# Patient Record
Sex: FEMALE | Race: WHITE | NOT HISPANIC OR LATINO | Employment: FULL TIME | URBAN - METROPOLITAN AREA
[De-identification: names, ages, dates, MRNs, and addresses within clinical notes are randomized per-mention and may not be internally consistent; named-entity substitution may affect disease eponyms.]

---

## 2017-01-18 ENCOUNTER — OFFICE VISIT - RIVER FALLS (OUTPATIENT)
Dept: FAMILY MEDICINE | Facility: CLINIC | Age: 37
End: 2017-01-18

## 2017-01-18 ASSESSMENT — MIFFLIN-ST. JEOR: SCORE: 1310.91

## 2018-03-14 ENCOUNTER — OFFICE VISIT - RIVER FALLS (OUTPATIENT)
Dept: FAMILY MEDICINE | Facility: CLINIC | Age: 38
End: 2018-03-14

## 2018-03-14 ASSESSMENT — MIFFLIN-ST. JEOR: SCORE: 1324.06

## 2018-03-15 LAB
CREAT SERPL-MCNC: 1.07 MG/DL (ref 0.5–1.1)
GLUCOSE BLD-MCNC: 73 MG/DL (ref 65–99)

## 2018-04-23 ENCOUNTER — OFFICE VISIT - RIVER FALLS (OUTPATIENT)
Dept: FAMILY MEDICINE | Facility: CLINIC | Age: 38
End: 2018-04-23

## 2018-04-23 ASSESSMENT — MIFFLIN-ST. JEOR: SCORE: 1285.96

## 2018-04-24 ENCOUNTER — AMBULATORY - RIVER FALLS (OUTPATIENT)
Dept: FAMILY MEDICINE | Facility: CLINIC | Age: 38
End: 2018-04-24

## 2018-07-16 ENCOUNTER — OFFICE VISIT - RIVER FALLS (OUTPATIENT)
Dept: FAMILY MEDICINE | Facility: CLINIC | Age: 38
End: 2018-07-16

## 2018-07-16 ASSESSMENT — MIFFLIN-ST. JEOR: SCORE: 1304.1

## 2018-08-10 ENCOUNTER — OFFICE VISIT - RIVER FALLS (OUTPATIENT)
Dept: FAMILY MEDICINE | Facility: CLINIC | Age: 38
End: 2018-08-10

## 2018-08-10 ASSESSMENT — MIFFLIN-ST. JEOR: SCORE: 1303.2

## 2018-09-05 ENCOUNTER — OFFICE VISIT - RIVER FALLS (OUTPATIENT)
Dept: FAMILY MEDICINE | Facility: CLINIC | Age: 38
End: 2018-09-05

## 2018-09-05 ASSESSMENT — MIFFLIN-ST. JEOR: SCORE: 1310.45

## 2018-09-06 LAB
CREAT SERPL-MCNC: 1.11 MG/DL (ref 0.5–1.1)
GLUCOSE BLD-MCNC: 95 MG/DL (ref 65–99)

## 2019-02-26 ENCOUNTER — OFFICE VISIT - RIVER FALLS (OUTPATIENT)
Dept: FAMILY MEDICINE | Facility: CLINIC | Age: 39
End: 2019-02-26

## 2019-02-26 ASSESSMENT — MIFFLIN-ST. JEOR: SCORE: 1299.57

## 2019-02-27 LAB
BUN SERPL-MCNC: 10 MG/DL (ref 7–25)
BUN/CREAT RATIO - HISTORICAL: ABNORMAL (ref 6–22)
CALCIUM SERPL-MCNC: 7.6 MG/DL (ref 8.6–10.2)
CHLORIDE BLD-SCNC: 105 MMOL/L (ref 98–110)
CO2 SERPL-SCNC: 27 MMOL/L (ref 20–32)
CREAT SERPL-MCNC: 0.85 MG/DL (ref 0.5–1.1)
EGFRCR SERPLBLD CKD-EPI 2021: 87 ML/MIN/1.73M2
GLUCOSE BLD-MCNC: 89 MG/DL (ref 65–99)
POTASSIUM BLD-SCNC: 4.5 MMOL/L (ref 3.5–5.3)
SODIUM SERPL-SCNC: 140 MMOL/L (ref 135–146)
T3FREE SERPL-MCNC: 3.3 PG/ML (ref 2.3–4.2)
T4 FREE SERPL-MCNC: 1.8 NG/DL (ref 0.8–1.8)
TSH SERPL DL<=0.005 MIU/L-ACNC: 0.03 MIU/L

## 2020-01-23 ENCOUNTER — OFFICE VISIT - RIVER FALLS (OUTPATIENT)
Dept: FAMILY MEDICINE | Facility: CLINIC | Age: 40
End: 2020-01-23

## 2020-01-23 ASSESSMENT — MIFFLIN-ST. JEOR: SCORE: 1290.72

## 2020-01-24 ENCOUNTER — COMMUNICATION - RIVER FALLS (OUTPATIENT)
Dept: FAMILY MEDICINE | Facility: CLINIC | Age: 40
End: 2020-01-24

## 2020-01-24 LAB
A/G RATIO - HISTORICAL: 1.9 (ref 1–2.5)
ALBUMIN SERPL-MCNC: 4.5 GM/DL (ref 3.6–5.1)
ALP SERPL-CCNC: 51 UNIT/L (ref 33–115)
ALT SERPL W P-5'-P-CCNC: 11 UNIT/L (ref 6–29)
AST SERPL W P-5'-P-CCNC: 12 UNIT/L (ref 10–30)
BILIRUB SERPL-MCNC: 0.5 MG/DL (ref 0.2–1.2)
BUN SERPL-MCNC: 11 MG/DL (ref 7–25)
BUN/CREAT RATIO - HISTORICAL: ABNORMAL (ref 6–22)
CALCIUM SERPL-MCNC: 8.5 MG/DL (ref 8.6–10.2)
CALCIUM SERPL-MCNC: 8.6 MG/DL (ref 8.6–10.2)
CALCIUM,IONIZED - HISTORICAL: 4.3 MG/DL (ref 4.8–5.6)
CHLORIDE BLD-SCNC: 104 MMOL/L (ref 98–110)
CHOLEST SERPL-MCNC: 168 MG/DL
CHOLEST/HDLC SERPL: 2.5 {RATIO}
CO2 SERPL-SCNC: 26 MMOL/L (ref 20–32)
CREAT SERPL-MCNC: 1.03 MG/DL (ref 0.5–1.1)
EGFRCR SERPLBLD CKD-EPI 2021: 69 ML/MIN/1.73M2
ERYTHROCYTE [DISTWIDTH] IN BLOOD BY AUTOMATED COUNT: 13.5 % (ref 11–15)
GLOBULIN: 2.4 (ref 1.9–3.7)
GLUCOSE BLD-MCNC: 94 MG/DL (ref 65–99)
HCT VFR BLD AUTO: 41.2 % (ref 35–45)
HDLC SERPL-MCNC: 66 MG/DL
HGB BLD-MCNC: 13.3 GM/DL (ref 11.7–15.5)
LDLC SERPL CALC-MCNC: 85 MG/DL
MCH RBC QN AUTO: 27.3 PG (ref 27–33)
MCHC RBC AUTO-ENTMCNC: 32.3 GM/DL (ref 32–36)
MCV RBC AUTO: 84.4 FL (ref 80–100)
NONHDLC SERPL-MCNC: 102 MG/DL
PLATELET # BLD AUTO: 337 10*3/UL (ref 140–400)
PMV BLD: 11.3 FL (ref 7.5–12.5)
POTASSIUM BLD-SCNC: 4.1 MMOL/L (ref 3.5–5.3)
PROT SERPL-MCNC: 6.9 GM/DL (ref 6.1–8.1)
PTH-INTACT SERPL-MCNC: <1 PG/ML (ref 14–64)
RBC # BLD AUTO: 4.88 10*6/UL (ref 3.8–5.1)
SODIUM SERPL-SCNC: 141 MMOL/L (ref 135–146)
T3FREE SERPL-MCNC: 3.1 PG/ML (ref 2.3–4.2)
T4 FREE SERPL-MCNC: 1.7 NG/DL (ref 0.8–1.8)
TRIGL SERPL-MCNC: 78 MG/DL
TSH SERPL DL<=0.005 MIU/L-ACNC: 0.05 MIU/L
WBC # BLD AUTO: 6.4 10*3/UL (ref 3.8–10.8)

## 2020-02-18 ENCOUNTER — COMMUNICATION - RIVER FALLS (OUTPATIENT)
Dept: FAMILY MEDICINE | Facility: CLINIC | Age: 40
End: 2020-02-18

## 2020-02-27 ENCOUNTER — COMMUNICATION - RIVER FALLS (OUTPATIENT)
Dept: FAMILY MEDICINE | Facility: CLINIC | Age: 40
End: 2020-02-27

## 2020-09-22 ENCOUNTER — OFFICE VISIT - RIVER FALLS (OUTPATIENT)
Dept: FAMILY MEDICINE | Facility: CLINIC | Age: 40
End: 2020-09-22

## 2020-11-09 ENCOUNTER — OFFICE VISIT - RIVER FALLS (OUTPATIENT)
Dept: FAMILY MEDICINE | Facility: CLINIC | Age: 40
End: 2020-11-09

## 2020-11-09 ENCOUNTER — COMMUNICATION - RIVER FALLS (OUTPATIENT)
Dept: FAMILY MEDICINE | Facility: CLINIC | Age: 40
End: 2020-11-09

## 2020-11-09 LAB
CLUE CELLS: NORMAL
TRICHOMONAS, WET PREP: NORMAL
YEAST, WET PREP: NORMAL

## 2020-11-09 ASSESSMENT — MIFFLIN-ST. JEOR: SCORE: 1242.64

## 2020-11-11 ENCOUNTER — COMMUNICATION - RIVER FALLS (OUTPATIENT)
Dept: FAMILY MEDICINE | Facility: CLINIC | Age: 40
End: 2020-11-11

## 2020-11-11 LAB
CHLAMYDIA TRACHOMATIS RNA, TMA - QUEST: NOT DETECTED
HSV CULTURE - QUEST: ABNORMAL
HSV TYPE 1: ABNORMAL
HSV TYPE 2: ABNORMAL
MICROBIOLOGY SPEC SOURCE (HISTORICAL): ABNORMAL
NEISSERIA GONORRHOEAE RNA TMA: NOT DETECTED

## 2020-11-16 ENCOUNTER — COMMUNICATION - RIVER FALLS (OUTPATIENT)
Dept: FAMILY MEDICINE | Facility: CLINIC | Age: 40
End: 2020-11-16

## 2021-02-22 ENCOUNTER — COMMUNICATION - RIVER FALLS (OUTPATIENT)
Dept: FAMILY MEDICINE | Facility: CLINIC | Age: 41
End: 2021-02-22

## 2021-02-23 ENCOUNTER — COMMUNICATION - RIVER FALLS (OUTPATIENT)
Dept: FAMILY MEDICINE | Facility: CLINIC | Age: 41
End: 2021-02-23

## 2021-02-23 ENCOUNTER — OFFICE VISIT - RIVER FALLS (OUTPATIENT)
Dept: FAMILY MEDICINE | Facility: CLINIC | Age: 41
End: 2021-02-23

## 2021-02-23 LAB
ALBUMIN UR-MCNC: NEGATIVE G/DL
BILIRUB UR QL STRIP: NEGATIVE
CLUE CELLS: PRESENT
GLUCOSE UR STRIP-MCNC: NEGATIVE MG/DL
HGB UR QL STRIP: NEGATIVE
KETONES UR STRIP-MCNC: NEGATIVE MG/DL
LEUKOCYTE ESTERASE UR QL STRIP: NEGATIVE
NITRATE UR QL: NEGATIVE
PH UR STRIP: 7 [PH] (ref 5–8)
SP GR UR STRIP: 1.01 (ref 1–1.03)
TRICHOMONAS, WET PREP: NORMAL
YEAST, WET PREP: NORMAL

## 2021-02-24 ENCOUNTER — COMMUNICATION - RIVER FALLS (OUTPATIENT)
Dept: FAMILY MEDICINE | Facility: CLINIC | Age: 41
End: 2021-02-24

## 2021-02-24 LAB
BUN SERPL-MCNC: 15 MG/DL (ref 7–25)
BUN/CREAT RATIO - HISTORICAL: NORMAL (ref 6–22)
CALCIUM SERPL-MCNC: 8.6 MG/DL (ref 8.6–10.2)
CHLAMYDIA TRACHOMATIS RNA, TMA - QUEST: NOT DETECTED
CHLORIDE BLD-SCNC: 105 MMOL/L (ref 98–110)
CO2 SERPL-SCNC: 27 MMOL/L (ref 20–32)
CREAT SERPL-MCNC: 1.07 MG/DL (ref 0.5–1.1)
EGFRCR SERPLBLD CKD-EPI 2021: 65 ML/MIN/1.73M2
GLUCOSE BLD-MCNC: 97 MG/DL (ref 65–99)
NEISSERIA GONORRHOEAE RNA TMA: NOT DETECTED
POTASSIUM BLD-SCNC: 3.9 MMOL/L (ref 3.5–5.3)
SODIUM SERPL-SCNC: 141 MMOL/L (ref 135–146)
T3FREE SERPL-MCNC: 2.9 PG/ML (ref 2.3–4.2)
T4 FREE SERPL-MCNC: 1.8 NG/DL (ref 0.8–1.8)
TSH SERPL DL<=0.005 MIU/L-ACNC: 0.08 MIU/L

## 2021-02-25 ENCOUNTER — COMMUNICATION - RIVER FALLS (OUTPATIENT)
Dept: FAMILY MEDICINE | Facility: CLINIC | Age: 41
End: 2021-02-25

## 2021-02-25 LAB — BACTERIA SPEC CULT: ABNORMAL

## 2021-06-16 ENCOUNTER — OFFICE VISIT - RIVER FALLS (OUTPATIENT)
Dept: FAMILY MEDICINE | Facility: CLINIC | Age: 41
End: 2021-06-16

## 2021-06-16 ASSESSMENT — MIFFLIN-ST. JEOR: SCORE: 1266.68

## 2021-06-17 ENCOUNTER — COMMUNICATION - RIVER FALLS (OUTPATIENT)
Dept: FAMILY MEDICINE | Facility: CLINIC | Age: 41
End: 2021-06-17

## 2021-06-17 LAB
25(OH)D3 SERPL-MCNC: 39 NG/ML (ref 30–100)
BUN SERPL-MCNC: 11 MG/DL (ref 7–25)
BUN/CREAT RATIO - HISTORICAL: 8 (ref 6–22)
CALCIUM SERPL-MCNC: 6.5 MG/DL (ref 8.6–10.2)
CALCIUM SERPL-MCNC: 6.6 MG/DL (ref 8.6–10.2)
CALCIUM,IONIZED - HISTORICAL: 3.4 MG/DL (ref 4.8–5.6)
CHLORIDE BLD-SCNC: 106 MMOL/L (ref 98–110)
CO2 SERPL-SCNC: 26 MMOL/L (ref 20–32)
CREAT SERPL-MCNC: 1.35 MG/DL (ref 0.5–1.1)
EGFRCR SERPLBLD CKD-EPI 2021: 49 ML/MIN/1.73M2
GLUCOSE BLD-MCNC: 92 MG/DL (ref 65–99)
MAGNESIUM SERPL-MCNC: 1.8 MG/DL (ref 1.5–2.5)
PHOSPHATE SERPL-MCNC: 4.4 MG/DL (ref 2.5–4.5)
POTASSIUM BLD-SCNC: 4.1 MMOL/L (ref 3.5–5.3)
PTH-INTACT SERPL-MCNC: <1 PG/ML (ref 14–64)
SODIUM SERPL-SCNC: 140 MMOL/L (ref 135–146)

## 2021-06-18 ENCOUNTER — COMMUNICATION - RIVER FALLS (OUTPATIENT)
Dept: FAMILY MEDICINE | Facility: CLINIC | Age: 41
End: 2021-06-18

## 2021-06-18 ENCOUNTER — AMBULATORY - RIVER FALLS (OUTPATIENT)
Dept: FAMILY MEDICINE | Facility: CLINIC | Age: 41
End: 2021-06-18

## 2021-06-18 LAB
A/G RATIO - HISTORICAL: 1.9 (ref 1–2.5)
ALBUMIN SERPL-MCNC: 4 GM/DL (ref 3.6–5.1)
ALP SERPL-CCNC: 38 UNIT/L (ref 31–125)
ALT SERPL W P-5'-P-CCNC: 11 UNIT/L (ref 6–29)
AST SERPL W P-5'-P-CCNC: 14 UNIT/L (ref 10–30)
BILIRUB DIRECT SERPL-MCNC: 0.1 MG/DL
BILIRUB INDIRECT SERPL-MCNC: 0.2 MG/DL (ref 0.2–1.2)
BILIRUB SERPL-MCNC: 0.3 MG/DL (ref 0.2–1.2)
BUN SERPL-MCNC: 11 MG/DL (ref 7–25)
BUN/CREAT RATIO - HISTORICAL: 10 (ref 6–22)
CALCIUM SERPL-MCNC: 6.9 MG/DL (ref 8.6–10.2)
CHLORIDE BLD-SCNC: 104 MMOL/L (ref 98–110)
CO2 SERPL-SCNC: 27 MMOL/L (ref 20–32)
CREAT SERPL-MCNC: 1.15 MG/DL (ref 0.5–1.1)
EGFRCR SERPLBLD CKD-EPI 2021: 59 ML/MIN/1.73M2
GLOBULIN: 2.1 (ref 1.9–3.7)
GLUCOSE BLD-MCNC: 111 MG/DL (ref 65–99)
POTASSIUM BLD-SCNC: 3.8 MMOL/L (ref 3.5–5.3)
PROT SERPL-MCNC: 6.1 GM/DL (ref 6.1–8.1)
SODIUM SERPL-SCNC: 139 MMOL/L (ref 135–146)

## 2021-06-19 ENCOUNTER — COMMUNICATION - RIVER FALLS (OUTPATIENT)
Dept: FAMILY MEDICINE | Facility: CLINIC | Age: 41
End: 2021-06-19

## 2021-06-21 ENCOUNTER — OFFICE VISIT - RIVER FALLS (OUTPATIENT)
Dept: FAMILY MEDICINE | Facility: CLINIC | Age: 41
End: 2021-06-21

## 2021-06-21 ENCOUNTER — COMMUNICATION - RIVER FALLS (OUTPATIENT)
Dept: FAMILY MEDICINE | Facility: CLINIC | Age: 41
End: 2021-06-21

## 2021-06-21 LAB
BUN SERPL-MCNC: 20 MG/DL (ref 7–25)
BUN/CREAT RATIO - HISTORICAL: 17 (ref 6–22)
CALCIUM SERPL-MCNC: 7.7 MG/DL (ref 8.6–10.2)
CHLORIDE BLD-SCNC: 105 MMOL/L (ref 98–110)
CO2 SERPL-SCNC: 26 MMOL/L (ref 20–32)
CREAT SERPL-MCNC: 1.18 MG/DL (ref 0.5–1.1)
EGFRCR SERPLBLD CKD-EPI 2021: 58 ML/MIN/1.73M2
GLUCOSE BLD-MCNC: 88 MG/DL (ref 65–99)
POTASSIUM BLD-SCNC: 4 MMOL/L (ref 3.5–5.3)
SODIUM SERPL-SCNC: 137 MMOL/L (ref 135–146)

## 2021-06-21 ASSESSMENT — MIFFLIN-ST. JEOR: SCORE: 1269.4

## 2021-06-22 ENCOUNTER — COMMUNICATION - RIVER FALLS (OUTPATIENT)
Dept: FAMILY MEDICINE | Facility: CLINIC | Age: 41
End: 2021-06-22

## 2021-06-22 LAB — CK SERPL-CCNC: 296 UNIT/L (ref 29–143)

## 2021-06-29 ENCOUNTER — OFFICE VISIT - RIVER FALLS (OUTPATIENT)
Dept: FAMILY MEDICINE | Facility: CLINIC | Age: 41
End: 2021-06-29

## 2021-06-29 ASSESSMENT — MIFFLIN-ST. JEOR: SCORE: 1271.67

## 2021-08-26 ENCOUNTER — AMBULATORY - RIVER FALLS (OUTPATIENT)
Dept: FAMILY MEDICINE | Facility: CLINIC | Age: 41
End: 2021-08-26

## 2021-08-27 LAB
BUN SERPL-MCNC: 12 MG/DL (ref 7–25)
BUN/CREAT RATIO - HISTORICAL: 10 (ref 6–22)
CALCIUM SERPL-MCNC: 8.7 MG/DL (ref 8.6–10.2)
CALCIUM,IONIZED - HISTORICAL: 4.4 MG/DL (ref 4.8–5.6)
CHLORIDE BLD-SCNC: 101 MMOL/L (ref 98–110)
CO2 SERPL-SCNC: 29 MMOL/L (ref 20–32)
CREAT SERPL-MCNC: 1.22 MG/DL (ref 0.5–1.1)
EGFRCR SERPLBLD CKD-EPI 2021: 55 ML/MIN/1.73M2
GLUCOSE BLD-MCNC: 81 MG/DL (ref 65–99)
POTASSIUM BLD-SCNC: 3.7 MMOL/L (ref 3.5–5.3)
SODIUM SERPL-SCNC: 139 MMOL/L (ref 135–146)

## 2021-08-29 ENCOUNTER — COMMUNICATION - RIVER FALLS (OUTPATIENT)
Dept: FAMILY MEDICINE | Facility: CLINIC | Age: 41
End: 2021-08-29

## 2021-11-11 ENCOUNTER — OFFICE VISIT - RIVER FALLS (OUTPATIENT)
Dept: FAMILY MEDICINE | Facility: CLINIC | Age: 41
End: 2021-11-11

## 2022-02-11 VITALS
WEIGHT: 146.4 LBS | WEIGHT: 146.6 LBS | DIASTOLIC BLOOD PRESSURE: 68 MMHG | HEIGHT: 63 IN | HEART RATE: 74 BPM | BODY MASS INDEX: 25.98 KG/M2 | SYSTOLIC BLOOD PRESSURE: 112 MMHG | SYSTOLIC BLOOD PRESSURE: 112 MMHG | TEMPERATURE: 98.5 F | DIASTOLIC BLOOD PRESSURE: 66 MMHG | BODY MASS INDEX: 25.94 KG/M2 | HEIGHT: 63 IN | HEART RATE: 60 BPM

## 2022-02-12 VITALS
BODY MASS INDEX: 26.22 KG/M2 | HEART RATE: 64 BPM | TEMPERATURE: 97.5 F | HEIGHT: 63 IN | SYSTOLIC BLOOD PRESSURE: 118 MMHG | DIASTOLIC BLOOD PRESSURE: 68 MMHG | WEIGHT: 148 LBS

## 2022-02-12 VITALS
WEIGHT: 141.2 LBS | BODY MASS INDEX: 25.02 KG/M2 | TEMPERATURE: 97.9 F | DIASTOLIC BLOOD PRESSURE: 62 MMHG | HEIGHT: 63 IN | SYSTOLIC BLOOD PRESSURE: 110 MMHG | DIASTOLIC BLOOD PRESSURE: 66 MMHG | BODY MASS INDEX: 24.82 KG/M2 | SYSTOLIC BLOOD PRESSURE: 112 MMHG | TEMPERATURE: 98.5 F | WEIGHT: 140.1 LBS | TEMPERATURE: 97.9 F | OXYGEN SATURATION: 100 % | HEART RATE: 78 BPM | HEART RATE: 62 BPM | WEIGHT: 140.7 LBS | HEART RATE: 80 BPM | OXYGEN SATURATION: 96 % | HEIGHT: 63 IN | BODY MASS INDEX: 24.93 KG/M2 | HEIGHT: 63 IN | DIASTOLIC BLOOD PRESSURE: 81 MMHG | SYSTOLIC BLOOD PRESSURE: 125 MMHG

## 2022-02-12 VITALS
HEART RATE: 72 BPM | HEIGHT: 63 IN | SYSTOLIC BLOOD PRESSURE: 98 MMHG | WEIGHT: 148.1 LBS | DIASTOLIC BLOOD PRESSURE: 62 MMHG | TEMPERATURE: 98.8 F | BODY MASS INDEX: 26.24 KG/M2

## 2022-02-12 VITALS
DIASTOLIC BLOOD PRESSURE: 72 MMHG | OXYGEN SATURATION: 99 % | WEIGHT: 138 LBS | HEART RATE: 78 BPM | SYSTOLIC BLOOD PRESSURE: 114 MMHG | BODY MASS INDEX: 24.84 KG/M2 | TEMPERATURE: 98.3 F

## 2022-02-12 VITALS
HEART RATE: 71 BPM | DIASTOLIC BLOOD PRESSURE: 60 MMHG | HEIGHT: 63 IN | OXYGEN SATURATION: 94 % | SYSTOLIC BLOOD PRESSURE: 96 MMHG | BODY MASS INDEX: 25.76 KG/M2 | WEIGHT: 145.4 LBS

## 2022-02-12 VITALS
WEIGHT: 142.6 LBS | BODY MASS INDEX: 26.75 KG/M2 | HEIGHT: 63 IN | DIASTOLIC BLOOD PRESSURE: 72 MMHG | HEIGHT: 63 IN | TEMPERATURE: 97.1 F | BODY MASS INDEX: 25.27 KG/M2 | SYSTOLIC BLOOD PRESSURE: 110 MMHG | TEMPERATURE: 98.3 F | SYSTOLIC BLOOD PRESSURE: 124 MMHG | DIASTOLIC BLOOD PRESSURE: 62 MMHG | WEIGHT: 151 LBS | HEART RATE: 84 BPM | HEART RATE: 108 BPM

## 2022-02-12 VITALS
SYSTOLIC BLOOD PRESSURE: 120 MMHG | HEART RATE: 87 BPM | DIASTOLIC BLOOD PRESSURE: 64 MMHG | HEART RATE: 76 BPM | OXYGEN SATURATION: 98 % | TEMPERATURE: 98.3 F | BODY MASS INDEX: 23.88 KG/M2 | WEIGHT: 134.8 LBS | BODY MASS INDEX: 24.57 KG/M2 | WEIGHT: 138.7 LBS | SYSTOLIC BLOOD PRESSURE: 120 MMHG | HEIGHT: 63 IN | DIASTOLIC BLOOD PRESSURE: 75 MMHG | TEMPERATURE: 98 F

## 2022-02-12 VITALS
HEART RATE: 72 BPM | HEIGHT: 63 IN | BODY MASS INDEX: 25.8 KG/M2 | DIASTOLIC BLOOD PRESSURE: 66 MMHG | WEIGHT: 145.6 LBS | SYSTOLIC BLOOD PRESSURE: 118 MMHG

## 2022-02-12 VITALS — HEIGHT: 63 IN | BODY MASS INDEX: 24.26 KG/M2

## 2022-02-15 NOTE — TELEPHONE ENCOUNTER
Entered by Mary Beth Chen CMA on May 10, 2021 12:52:09 PM CDT  ---------------------  From: Mary Beth Chen CMA   To: [facility] #[id number]    Sent: 5/10/2021 12:52:09 PM CDT  Subject: Medication Management     ** Not Approved: Patient has requested refill too soon, #90, 3 sent 2/23/21 **  levothyroxine (LEVOTHYROXINE 0.150MG (150MCG) TAB)  TAKE 1 TABLET BY MOUTH DAILY  Qty:  90 tab(s)        Days Supply:  90        Refills:  0          Substitutions Allowed     Route To Pharmacy - [facility] #[id number]   Signed by Mary Beth Chen CMA            ------------------------------------------  From: [facility] #[id number]  To: Vinh Moyer PA-C  Sent: May 8, 2021 3:30:52 AM CDT  Subject: Medication Management  Due: April 27, 2021 12:41:43 AM CDT     ** On Hold Pending Signature **     Dispensed Drug: levothyroxine (levothyroxine 150 mcg (0.15 mg) oral tablet), TAKE 1 TABLET BY MOUTH DAILY  Quantity: 90 tab(s)  Days Supply: 90  Refills: 0  Substitutions Allowed  Notes from Pharmacy:  ------------------------------------------

## 2022-02-15 NOTE — TELEPHONE ENCOUNTER
---------------------  From: Ivis Pacheco CMA (eRx Pool (32224_[facility]))   To: ANN Message Pool (32224_[facility]);     Sent: 2/11/2021 7:26:03 AM CST  Subject: FW: Medication Management   Due Date/Time: 2/11/2021 11:25:00 AM CST           Entered by Ivis Pacheco CMA on February 11, 2021 7:25:45 AM CST    PCP:  ANN    Medication:    Last Filled: 1/23/2020  Quantity: 180  Refills:  3    Date of last office visit and reason:  Genital Lesion 11/9/2020  Date of last labs pertaining to condition:  1/23/2020    Note:  Over due for med check and labs    Return to Clinic order placed?  yes        ------------------------------------------  From: [facility] #[id number]  To: Vinh Moyer PA-C  Sent: February 10, 2021 11:25:53 AM CST  Subject: Medication Management  Due: January 30, 2021 4:21:54 PM CST     ** On Hold Pending Signature **     Dispensed Drug: levothyroxine (levothyroxine 150 mcg (0.15 mg) oral tablet), TAKE 1 TABLET BY MOUTH DAILY  Quantity: 90 tab(s)  Days Supply: 90  Refills: 0  Substitutions Allowed  Notes from Pharmacy:     ** On Hold Pending Signature **     Dispensed Drug: calcitriol (calcitriol 0.5 mcg oral capsule), TAKE 2 CAPSULES BY MOUTH DAILY  Quantity: 180 cap(s)  Days Supply: 90  Refills: 0  Substitutions Allowed  Notes from Pharmacy:     ** On Hold Pending Signature **     Dispensed Drug: SUMAtriptan (SUMAtriptan 100 mg oral tablet), TAKE 1 TABLET BY MOUTH EVERY DAY AS NEEDED FOR MIGRAINE HEADACHE AS DIRECTED  Quantity: 90 tab(s)  Days Supply: 90  Refills: 0  Substitutions Allowed  Notes from Pharmacy:  ---------------------------------------------------------------  From: Mary Early CMA ([facility] Message Pool (32224_[facility]))   To: Vladimir Mahajan MD;     Sent: 2/11/2021 7:59:35 AM CST  Subject: FW: Medication Management   Due Date/Time: 2/11/2021 11:25:00 AM CST---------------------  From: Vladimir Mahajan MD   [id number]    Sent:  2/11/2021 8:29:44 AM CST  Subject: FW: Medication Management     ** Submitted: **  Complete:SUMAtriptan (SUMAtriptan 6 mg/0.5 mL subcutaneous solution)   Signed by Vladimir Mahajan MD  2/11/2021 2:29:00 PM [facility]    ** Submitted: **  Complete:SUMAtriptan (SUMAtriptan 100 mg oral tablet)   Signed by Vladimir Mahajan MD  2/11/2021 2:29:00 PM [facility]    ** Submitted: **  Complete:levothyroxine (levothyroxine 150 mcg (0.15 mg) oral tablet)   Signed by Vladimir Mahajan MD  2/11/2021 2:29:00 PM [facility]    ** Submitted: **  Complete:calcitriol (calcitriol 0.5 mcg oral capsule)   Signed by Vladimir Mahajan MD  2/11/2021 2:29:00 PM [facility]    ** Approved with modifications: **  calcitriol (CALCITRIOL 0.5MCG CAPSULES)  TAKE 2 CAPSULES BY MOUTH DAILY  Qty:  180 cap(s)        Days Supply:  90        Refills:  0          Substitutions Allowed     Route To [facility] #[id number]   Note to Pharmacy:  Patient needs appointment  Signed by Vladimir Mahajan MD    ** Approved with modifications: **  levothyroxine (LEVOTHYROXINE 0.150MG (150MCG) TAB)  TAKE 1 TABLET BY MOUTH DAILY  Qty:  90 tab(s)        Days Supply:  90        Refills:  0          Substitutions Allowed     Route To [facility] #[id number]   Note to Pharmacy:  Patient needs appointment  Signed by Vladimir Mahajan MD    ** Approved with modifications: **  SUMAtriptan (SUMATRIPTAN 100MG TABLETS)  TAKE 1 TABLET BY MOUTH EVERY DAY AS NEEDED FOR MIGRAINE HEADACHE AS DIRECTED  Qty:  90 tab(s)        Days Supply:  90        Refills:  0          Substitutions Allowed     Route To [facility] #[id number]   Note to Pharmacy:  Patient needs appointment  Signed by Vladimir Mahajan MD

## 2022-02-15 NOTE — TELEPHONE ENCOUNTER
---------------------  From: Mary Early CMA (eRx Pool (32224_[facility]))   To: [facility] Provider [facility] (32224_[facility]);     Sent: 9/14/2021 10:32:34 AM CDT  Subject: FW: Medication Management   Due Date/Time: 9/14/2021 11:07:00 AM CDT       PCP:   CHT    Medication:   Sumatriptan  Last Filled:  2/23/21    Quantity:  15  Refills:  1  CSA on file?   N/A     Date of last office visit and reason:   6/29/21 Renal w/ BRM  Date of last labs pertaining to condition:  TSH 2/23/21 w/ low results    Note:  Advise as CHT is out until 9/21/21    Return to Clinic order placed?  Yes, due back 2/22    Resource:   [facility]  Phone:   _          ------------------------------------------  From: [facility] #[id number]  To: Vladimir Mahajan MD  Sent: September 9, 2021 11:07:13 AM CDT  Subject: Medication Management  Due: August 20, 2021 11:17:32 AM CDT     ** On Hold Pending Signature **     Dispensed Drug: SUMAtriptan (SUMAtriptan 100 mg oral tablet), TAKE 1 TABLET BY MOUTH DAILY AS NEEDED FOR MIGRAINE HEADACHE AS DIRECTED  Quantity: 15 tab(s)  Days Supply: 15  Refills: 0  Substitutions Allowed  Notes from Pharmacy:  ---------------------------------------------------------------  From: Lavonne Armenta   To: [facility] #[id number]    Sent: 9/14/2021 12:31:31 PM CDT  Subject: FW: Medication Management     ** Submitted: **  Complete:SUMAtriptan (SUMAtriptan 100 mg oral tablet)   Signed by Lavonne Armenta  9/14/2021 5:31:00 PM [facility]    ** Approved with modifications: **  SUMAtriptan (SUMATRIPTAN 100MG TABLETS)  TAKE 1 TABLET BY MOUTH DAILY AS NEEDED FOR MIGRAINE HEADACHE AS DIRECTED  Qty:  15 tab(s)        Days Supply:  15        Refills:  0          Substitutions Allowed     Route To Pharmacy - [facility] #[id number]   Signed by Lavonne Armenta

## 2022-02-15 NOTE — TELEPHONE ENCOUNTER
---------------------  From: Mirela Prado CMA (Phone Messages Pool (94029_[facility]))   To: INGRID CANALES    Sent: 2/27/2020 8:32:57 AM CST  Subject: RE: Medication Refills     Good Morning Ingrid,    I called [facility] and they do have refills on your medications, they will be filling them for you and will have them ready for you to  today. When you seen Vinh in January he did fill all of your medications for a year at that visit.     Please let us know if you have any other questions.     Mirela Wall CMA       ---------------------  From: INGRID CANALES  To: [facility]  Sent: 02/26/2020 05:58 p.m. CST  Subject: Medication Refills  I was in last month for med refills and to discuss migraines.  I thought Vinh had sent refill approval to [facility] for all of my meds, but they are telling me that I can t get any of them, due to no refills. They said that they submitted a request to the provider, which was denied. ???

## 2022-02-15 NOTE — PROGRESS NOTES
Patient:   INGRID CANALES            MRN: [id number]            FIN: [id number]               Age:   41 years     Sex:  Female     :  1980   Associated Diagnoses:   Hypocalcemia; Hypoparathyroidism   Author:   Rohan Bunn MD      Visit Information      Date of Service: 2021 04:49 pm  Performing Location: [facility]  Encounter#: [id number]      Primary Care Provider (PCP):  Vladimir Mahajan MD    NPI# [id number]      Referring Provider:  Rohan Bunn MD    NPI# [id number]      Chief Complaint   2021 5:04 PM CDT    c/o low calcium, muscle spasms      History of Present Illness   Had Thyroidectomy age 21 for goiter. Thinks has two parathyroid glands left seen during surgery but likely not very functional. Has been taking calcium and calcitriol daily since the surgery and usually takes care of it. Has had to take IV calcium about a dozen times but need ed it more in the beginning following the surgery. Started feeling muscle pains yesterday morning and took extra calcium. Did donate blood yesterday. Woke up with pain in legs and arms but legs have improved with continued spasm in forearm and hands.      Health Status   Allergies:    Allergic Reactions (Selected)  Severity Not Documented  Rondec (Rash)  Nonallergic Reactions (Selected)  Severity Not Documented  Wellbutrin XL (Rash)   Medications:  (Selected)   Prescriptions  Prescribed  Pataday 0.2% ophthalmic solution: 1 drop(s), both eyes, daily, # 2.5 mL, 3 Refill(s), Type: Maintenance, Pharmacy: [facility] #[id number], 1 drop(s) Eye-Both daily  SUMAtriptan 100 mg oral tablet: = 1 tab(s), Oral, daily, Instructions: AS DIRECTED., PRN: AS NEEDED FOR MIGRAINE HEADACHE, # 15 tab(s), 1 Refill(s), Type: Soft Stop, Pharmacy: [facility] #[id number], 1 tab(s) Oral daily,PRN:AS NEEDED FOR MIGRAINE HEADACHE,Instr:AS DIRECTED., 62...  calcitriol 0.5 mcg oral capsule: = 2 cap(s), Oral, daily, # 180 cap(s), 3 Refill(s), Type:  Maintenance, Pharmacy: [facility] #[id number], 2 cap(s) Oral daily, 62.5, in, 20 13:23:00 CST, Height Measured, 138, lb, 21 14:31:00 CST, Weight Measured  levothyroxine 150 mcg (0.15 mg) oral tablet: = 1 tab(s), Oral, daily, # 90 tab(s), 3 Refill(s), Type: Maintenance, Pharmacy: [facility] #[id number], 1 tab(s) Oral daily, 62.5, in, 20 13:23:00 CST, Height Measured, 138, lb, 21 14:31:00 CST, Weight Measured  topiramate 50 mg oral tablet: = 1 tab(s), Oral, daily, # 90 tab(s), 3 Refill(s), Type: Maintenance, Pharmacy: [facility] #[id number], 1 tab(s) Oral daily, 62.5, in, 20 13:23:00 CST, Height Measured, 138, lb, 21 14:31:00 CST, Weight Measured  Documented Medications  Documented  Prenatal Multivitamins: Oral, daily, 0 Refill(s), Type: Maintenance  Tums Ultra 1000 mg oral tablet, chewable: Instructions: Up to 5 tabs daily, 0 Refill(s)   Problem list:    All Problems  Acquired hypothyroidism / SNOMED CT 358378758 / Confirmed  Hypocalcemia / SNOMED CT 5891227 / Confirmed  Migraines / SNOMED CT 70624514 / Confirmed  Mild depression / SNOMED CT 259753272 / Confirmed  Obesity / SNOMED CT 7087965925 / Probable  Hypoparathyroidism / SNOMED CT 002316356 / Confirmed  Tobacco abuse / ICD-9-.1 / Confirmed  Resolved: *Hospitalized@[facility] - Hypocalcemia  Resolved: AMA (advanced maternal age) multigravida 35+ / SNOMED CT 0200937086  Resolved: Pregnancy / SNOMED CT 148769400  Resolved: Pregnancy / SNOMED CT 327336770  Resolved: Pregnant / SNOMED CT 959292547      Histories   Procedure history:     delivery (SNOMED CT 6068822083) on 2016 at 36 Years.  HSG - Hysterosalpingogram (SNOMED CT 157554312) performed by Jairo Bales DO on 10/5/2015 at 35 Years.  Appendectomy (OMED CT 781181840) on 2010 at 30 Years.  Laparoscopic cholecystectomy (OMED CT 55968560) on 2010 at 30 Years.   section (OMED CT 91685534) on 2007 at 26  Years.  Thyroidectomy (SNOMED CT 88251326) performed by Eben Landry MD on 3/18/2003 at 23 Years.  Ts and As - Tonsillectomy and adenoidectomy (SNOMED CT 0895548764) in 1993 at 13 Years.  Myringotomy and insertion of T tube (SNOMED CT 230462871).      Physical Examination   Vital Signs   6/16/2021 5:04 PM CDT Temperature Tympanic 98.5 DegF    Peripheral Pulse Rate 80 bpm    Pulse Site Radial artery    HR Method Electronic    Systolic Blood Pressure 112 mmHg    Diastolic Blood Pressure 66 mmHg    Mean Arterial Pressure 81 mmHg    BP Site Right arm    BP Method Manual    Oxygen Saturation 96 %      Measurements from flowsheet : Measurements   6/16/2021 5:04 PM CDT Height Measured - Standard 62.5 in    Height/Length Estimated 62.5 in    Weight Measured - Standard 140.1 lb    BSA 1.67 m2    Body Mass Index 25.21 kg/m2  HI      General:  Alert and oriented, No acute distress.    Neck:  No lymphadenopathy.    Respiratory:  Lungs are clear to auscultation.    Cardiovascular:  Normal rate, Regular rhythm.    Musculoskeletal:  Normal gait.    Psychiatric:  Appropriate mood & affect.       Impression and Plan   Diagnosis     Hypocalcemia (UEH76-JR E83.51).     Hypoparathyroidism (JRY05-NL E89.2).       will check labs. Unable to get stat calcium. Feels getting better but will go to ER if getting worse for stat calcium and possible IV calcium. Declines EKG. Continue calcium and vitamin D. Recommend Nephrology consult.     Addendum 6/17: Calcium 6.5. Reports stable overnight. Will go to infusion center for calcium gluconate 2gm IV and increase calcitriol to 1.5mcg daily. Recheck labs tomorrow

## 2022-02-15 NOTE — LETTER
(Inserted Image. Unable to display)   January 25, 2021      INGRID CANALES  [address], [place] [id number]        Dear INGRID,      Thank you for selecting [facility] (previously [facility]) for your healthcare needs.     Our records indicate you are due for the following services:    Medication Check  Non-Fasting Lab    If you had your labs done at another facility or with Direct Access Lab Testing at [facility], please bring in a copy of the results to your next visit, mail a copy, or drop off a copy of your results to your Healthcare Provider.    You are due for lab work and an office visit; please schedule the lab appointment 1 week before the office visit.  This will assure all results are available to discuss with your Healthcare Provider during your visit.    (FYI   Regarding office visits: In some instances, a video visit or telephone visit may be offered as an option.)    **It is very helpful if you bring your medication bottles to your appointment.  This assures we have all of your current medications, including strength and dosing information, documented accurately in your medical record.    To schedule an appointment or if you have further questions, please contact your clinic at [phone].       Powered by [employer]    Sincerely,    Vinh Moyer PA-C

## 2022-02-15 NOTE — LETTER
(Inserted Image. Unable to display)   [address] [place] [address] [phone] (phone)  [phone] (fax)  INGRID CANALES    [address] [place] [address]        Dear INGRID,    Thank you for selecting our practice as your care provider. Below you will find the results and recent diagnostic testings outcomes we have reviewed.        These are stable, please keep scheduled follow up appointments.      Result Name Current Result Previous Result Reference Range   Sodium Level (mmol/L)  139 8/26/2021  137 6/21/2021   139 6/18/2021   140 6/16/2021 135 - 146   Potassium Level (mmol/L)  3.7 8/26/2021  4.0 6/21/2021   3.8 6/18/2021   4.1 6/16/2021 3.5 - 5.3   Chloride Level (mmol/L)  101 8/26/2021  105 6/21/2021   104 6/18/2021   106 6/16/2021 98 - 110   CO2 Level (mmol/L)  29 8/26/2021  26 6/21/2021   27 6/18/2021   26 6/16/2021 20 - 32   Glucose Level (mg/dL)  81 8/26/2021  88 6/21/2021  ((H)) 111 6/18/2021   92 6/16/2021 65 - 99   BUN (mg/dL)  12 8/26/2021  20 6/21/2021   11 6/18/2021   11 6/16/2021 7 - 25   Creatinine Level (mg/dL) ((H)) 1.22 8/26/2021 ((H)) 1.18 6/21/2021  ((H)) 1.15 6/18/2021  ((H)) 1.35 6/16/2021 0.50 - 1.10   BUN/Creat Ratio  10 8/26/2021  17 6/21/2021   10 6/18/2021   8 6/16/2021 6 - 22   eGFR (mL/min/1.73m2) ((L)) 55 8/26/2021 ((L)) 58 6/21/2021  ((L)) 59 6/18/2021  ((L)) 49 6/16/2021 > OR = 60 -    Calcium Level (mg/dL)  8.7 8/26/2021 ((L)) 7.7 6/21/2021  ((L)) 6.9 6/18/2021  ((L)) 6.5 6/16/2021 8.6 - 10.2   Calcium Ionized (mg/dL) ((L)) 4.4 8/26/2021 ((L)) 3.4 6/16/2021  ((L)) 4.3 1/23/2020 4.8 - 5.6       Please contact my practice at the number listed above if you have any questions or concerns.     Sincerely,        Vladimir Mahajan MD, FAAFP      What do your labs mean? Below is a glossary of commonly ordered labs:  LDL - Bad Cholesterol HDL - Good Cholesterol AST/ALT - Liver Function  PSA -  Prostate  TSH - Thyroid  Hgb (Hemoglobin) - Red Blood  Cells  Cr/Creatinine/Microalbumin/ BUN/eGFR - Kidney Function HgbA1c - Diabetes Test

## 2022-02-15 NOTE — PROGRESS NOTES
Patient:   INGRID CANALES            MRN: [id number]            FIN: [id number]               Age:   38 years     Sex:  Female     :  1980   Associated Diagnoses:   Diarrhea   Author:   Vladimir Mahajan MD      Chief Complaint   2018 4:23 PM CDT    c/o diarrhea, vomiting, no appetite x Friday     Chief complaint and symptoms noted above confirmed with patient.      History of Present Illness             The patient presents with diarrhea.  The diarrhea is described as frothy, liquid and loose.  The severity of the diarrhea is moderate.  The diarrhea has lasted for 4 day(s).  Episodes of diarrhea consist of 6 total episodes, over last 12 hour(s).  Exacerbating factors consist of movement.  Relieving factors consist of none.  Associated symptoms consist of abdominal pain, nausea, vomiting, denies blood in stool, denies fever and denies dizziness.        Review of Systems   Constitutional:  Negative except as documented in history of present illness.    Respiratory:  Negative.    Cardiovascular:  Negative.    Gastrointestinal:  Negative except as documented in history of present illness.       Health Status   Allergies:    Allergic Reactions (Selected)  Severity Not Documented  Rondec (Rash)  Nonallergic Reactions (Selected)  Severity Not Documented  Wellbutrin XL (Rash)   Medications:  (Selected)   Prescriptions  Prescribed  Metoprolol Tartrate 25 mg oral tablet: 1 tab(s) ( 25 mg ), po, daily, # 90 tab(s), 3 Refill(s), Type: Maintenance, Pharmacy: [facility] #[id number], 1 tab(s) po daily  Pataday 0.2% ophthalmic solution: 1 drop(s), both eyes, daily, # 2.5 mL, 1 Refill(s), Type: Maintenance, Pharmacy: [facility] #[id number], 1 drop(s) both eyes daily  SUMAtriptan 100 mg oral tablet: 1 tab(s) ( 100 mg ), po, daily, PRN: as needed for migraine headache, # 9 tab(s), 11 Refill(s), Type: Soft Stop, Pharmacy: [facility] #[id number], 1 tab(s) po daily,PRN:as needed for migraine headache  calcitriol 0.5 mcg oral  capsule: 2 cap(s) ( 1 mcg ), po, daily, # 180 cap(s), 3 Refill(s), Type: Maintenance, Pharmacy: [facility] #[id number], 2 cap(s) po daily  cyclobenzaprine 10 mg oral tablet: 1 tab(s) ( 10 mg ), po, tid, # 30 tab(s), Type: Soft Stop, Pharmacy: [facility] #[id number]  levothyroxine 150 mcg (0.15 mg) oral capsule: 1 cap(s) ( 150 mcg ), po, daily, Instructions: Needs appt for further refills, # 90 cap(s), 3 Refill(s), Type: Maintenance, Pharmacy: [facility] #[id number], 1 cap(s) po daily,Instr:Needs appt for further refills  Documented Medications  Documented  Tums Ultra 1000 mg oral tablet, chewable: Instructions: Up to 5 tabs daily, 0 Refill(s)   Problem list:    All Problems (Selected)  Acquired hypothyroidism / SNOMED CT 215825565 / Confirmed  Hypocalcemia / SNOMED CT 1858541 / Confirmed  Migraines / SNOMED CT 64533863 / Confirmed  Mild depression / SNOMED CT 798752658 / Confirmed  Obesity / SNOMED CT 7831251609 / Probable  Hypoparathyroidism / SNOMED CT 564371551 / Confirmed  Tobacco abuse / ICD-9-.1 / Confirmed      Histories   Past Medical History:    Active  Acquired hypothyroidism (SNOMED CT 19819)  Hypoparathyroidism (SNOMED CT 976899888)  Mild depression (SNOMED CT 639027516)  Tobacco abuse (ICD-9-.1)  Hypocalcemia (SNOMED CT 6303340)  Resolved  *[facility] - Hypocalcemia: Onset on 2013 at 33 years.  Resolved on 2013 at 33 years.  Pregnancy (SNOMED CT 877051183):  Resolved in  at 18 years.  Pregnancy (SNOMED CT 608203995):  Resolved in  at 26 years.  AMA (advanced maternal age) multigravida 35+ (SNOMED CT 2558021807):  Resolved.   Family History:    Alive and well  Mother  Father     Procedure history:     delivery (SNOMED CT 5937512044) on 2016 at 36 Years.  HSG - Hysterosalpingogram (SNOMED CT 932865025) performed by Jairo Bales DO on 10/5/2015 at 35 Years.  Appendectomy ([facility] CT 129232349) on 2010 at 30 Years.  Laparoscopic cholecystectomy  (SNOMED CT 94345404) on 2010 at 30 Years.   section (SNOMED CT 45544496) on 2007 at 26 Years.  Thyroidectomy (SNOMED CT 91879758) performed by Eben Landry MD on 3/18/2003 at 23 Years.  Ts and As - Tonsillectomy and adenoidectomy (SNOMED CT 3720815629) in  at 13 Years.  Myringotomy and insertion of T tube (SNOMED CT 407588537).      Physical Examination   Vital Signs   2018 4:23 PM CDT Temperature Tympanic 98.3 DegF    Peripheral Pulse Rate 108 bpm  HI    Pulse Site Radial artery    HR Method Manual    Systolic Blood Pressure 110 mmHg    Diastolic Blood Pressure 62 mmHg    Mean Arterial Pressure 78 mmHg    BP Site Left arm    BP Method Manual      Measurements from flowsheet : Measurements   2018 4:23 PM CDT Height Measured - Standard 63 in    Weight Measured - Standard 142.6 lb    BSA 1.69 m2    Body Mass Index 25.26 kg/m2  HI      General:  Alert and oriented, No acute distress.    Eye:  Normal conjunctiva.    HENT:  Normocephalic.    Neck:  Supple, Non-tender.    Respiratory:  Lungs are clear to auscultation.    Cardiovascular:  Normal rate, Regular rhythm.    Gastrointestinal:  Soft, Non-tender, Non-distended, Normal bowel sounds.       Impression and Plan   Diagnosis     Diarrhea (QGA58-GM R19.7).     Course:  Progressing as expected.    Plan:  Oral rehydration.         Diet: Increase fluid intake, Restrict caffeine consumption.    Orders     Will get stool studies as soon as she can get us a sample..

## 2022-02-15 NOTE — NURSING NOTE
Patient called to say that the Tylenol 3 made her sick, and has not helped with her migraines.  Would like rx for Percocet to try.  Okay per MFT for # 20.  LM for patien to call back.

## 2022-02-15 NOTE — TELEPHONE ENCOUNTER
Entered by Megan Whitehead on September 03, 2020 12:31:19 PM CDT  ---------------------  From: Megan Whitehead   To: [facility] #[id number]    Sent: 9/3/2020 12:31:19 PM CDT  Subject: Medication Management     ** Submitted: **  Order:topiramate (topiramate 50 mg oral tablet)  1 tab(s)  Oral  daily  Qty:  90 tab(s)        Days Supply:  90        Refills:  1          Substitutions Allowed     Route To Pharmacy - [facility] #[id number]    Signed by Megan Whitehead  9/3/2020 5:31:00 PM [place]    ** Submitted: **  Complete:topiramate (topiramate 50 mg oral tablet)   Signed by Megan Whitehead  9/3/2020 5:31:00 PM [facility]    ** Not Approved:  **  topiramate (TOPIRAMATE 50MG TABLETS)  TAKE 1 TABLET BY MOUTH DAILY  Qty:  90 tab(s)        Days Supply:  90        Refills:  0          Substitutions Allowed     Route To Pharmacy - [facility] #[id number]   Signed by Megan Whitehead          Med Refill      Date of last office visit and reason:  1/23/20 for chronic disease visit with K[facility]      Date of last Med Check / Px:   1/23/20  Date of last labs pertaining to med:  n/a    RTC order in chart:  Yes.  UTD through Jan 2021.  will fill          ------------------------------------------  From: [facility] #[id number]  To: Vinh Moyer PA-C  Sent: September 3, 2020 12:02:47 PM CDT  Subject: Medication Management  Due: September 1, 2020 8:07:28 PM CDT     ** On Hold Pending Signature **     Dispensed Drug: topiramate (topiramate 50 mg oral tablet), TAKE 1 TABLET BY MOUTH DAILY  Quantity: 90 tab(s)  Days Supply: 90  Refills: 0  Substitutions Allowed  Notes from Pharmacy:  ------------------------------------------

## 2022-02-15 NOTE — TELEPHONE ENCOUNTER
---------------------  From: Joey DE JESUS, Kareen CARLTON (Phone Messages Pool ([id number]_[facility]))   To: [facility] Message Pool ([id number]_[facility]); INGRID CANALES    Sent: 11/16/2020 1:12:46 PM CST  Subject: RE: Work Leave     Angélica Muhammad,    Have you been tested for COVID? When did your symptoms start? Are your symptoms same, improving, or worsening?    Dr. Mahajan may want you to have an appointment to discuss your symptoms and get tested. I will forward your message.    Thank you,    Kareen Cook RN      ---------------------  From: INGRID CANALES  To: [facility]  Sent: 11/16/2020 12:26 p.m. CST  Subject: Work Leave  I need a Dr note faxed to [phone] that includes my name and birthdate and an ok to return to work date that states that I will be off through at least the 19th (supposed to work the 17th and 18th night shifts) due to possible Covid symptoms after a known exposure.  My symptoms are a severe headache, cough and sore throat, but they do not need to be included in my work letter.  I plan to follow up if my symptoms worsen or continue to not improve. Thanks!---------------------  From: Uche Horn ([employer] Message Pool ([id number]_[facility]))   To: INGRID CANALES    Sent: 11/16/2020 1:20:37 PM CST  Subject: FW: Work Leave

## 2022-02-15 NOTE — TELEPHONE ENCOUNTER
---------------------  From: Rohan Bunn MD   To: INGRID CANALES    Cc: Steven LIRIANO, Jr;      Sent: 6/18/2021 7:53:50 AM CDT  Subject: Labs     Dear Ingrid    These are labs from Wednesday  The PTH intact is likely low because your parathyroids are no longer working.      Results:  Date Result Name Ind Value Ref Range   6/16/2021 5:50 PM Calcium Level ((L)) 6.6 mg/dL (8.6 - 10.2)   6/16/2021 5:50 PM Calcium Ionized ((L)) 3.4 mg/dL (4.8 - 5.6)   6/16/2021 5:50 PM Phosphorus Level  4.4 mg/dL (2.5 - 4.5)   6/16/2021 5:50 PM Magnesium Level  1.8 mg/dL (1.5 - 2.5)   6/16/2021 5:50 PM PTH Intact ((L)) <1 pg/mL (14 - 64)   6/16/2021 5:50 PM Vitamin D 25 OH  39 ng/mL (30 - 100)     Edwin Bunn M.D.

## 2022-02-15 NOTE — NURSING NOTE
CAGE Assessment Entered On:  1/23/2020 8:27 AM CST    Performed On:  1/23/2020 8:27 AM CST by Ivis Pacheco CMA               Assessment   Have you ever felt you should cut down on your drinking :   No   Have people annoyed you by criticizing your drinking :   No   Have you ever felt bad or guilty about your drinking :   No   Have you ever taken a drink first thing in the morning to steady your nerves or get rid of a hangover (Eye-opener) :   No   CAGE Score :   0    Ivis Pacheco CMA - 1/23/2020 8:27 AM CST

## 2022-02-15 NOTE — TELEPHONE ENCOUNTER
Entered by Connie Munoz CMA on February 26, 2020 4:14:35 PM CST  ---------------------  From: Connie Munoz CMA   To: [facility] #[id number]    Sent: 2/26/2020 4:14:35 PM CST  Subject: Medication Management     ** Not Approved: Refill not appropriate, Pt given #90 w/ 3 refills on 1/23/20 **  SUMAtriptan (SUMATRIPTAN 100MG TABLETS)  TAKE 1 TABLET BY MOUTH DAILY AS NEEDED FOR MIGRAINE HEADACHE  Qty:  9 tab(s)        Days Supply:  9        Refills:  0          Substitutions Allowed     Route To Pharmacy - [facility] #[id number]   Signed by Connie Munoz CMA            ------------------------------------------  From: [facility] #[id number]  To: Vladimir Mahajan MD  Sent: February 26, 2020 2:01:27 PM CST  Subject: Medication Management  Due: February 27, 2020 2:01:27 PM CST    ** On Hold Pending Signature **  Drug: SUMAtriptan (SUMAtriptan 100 mg oral tablet)  TAKE 1 TABLET BY MOUTH DAILY AS NEEDED FOR MIGRAINE HEADACHE  Quantity: 9 tab(s)  Days Supply: 9  Refills: 0  Substitutions Allowed  Notes from Pharmacy:     Dispensed Drug: SUMAtriptan (SUMAtriptan 100 mg oral tablet)  TAKE 1 TABLET BY MOUTH DAILY AS NEEDED FOR MIGRAINE HEADACHE  Quantity: 9 tab(s)  Days Supply: 9  Refills: 0  Substitutions Allowed  Notes from Pharmacy:   ------------------------------------------

## 2022-02-15 NOTE — PROGRESS NOTES
Patient:   INGRID CANALES            MRN: [id number]            FIN: [id number]               Age:   40 years     Sex:  Female     :  1980   Associated Diagnoses:   Benign nevus; Wart   Author:   Naif Johnson MD      Visit Information      Date of Service: 2020 04:17 pm  Performing Location: [facility]  Encounter#: [id number]      Primary Care Provider (PCP):  Vladimir Mahajan MD    NPI# [id number]      Referring Provider:  Naif Johnson MD    NPI# [id number]      Chief Complaint   2020 4:17 PM CDT    pt here today for wart removal on hand, check moles.        History of Present Illness   chief complaint and symptoms as noted above confirmed with patient   She has a wart on the base of her right thumb is been there for several years a was treated in the past and almost went away but now is coming back.  She also has a couple moles on her right face she like it checked out they may be changed a little bit.         Review of Systems   Constitutional:  Negative except as documented in history of present illness.    Integumentary:  Negative except as documented in history of present illness.       Health Status   Allergies:    Allergic Reactions (Selected)  Severity Not Documented  Rondec (Rash)  Nonallergic Reactions (Selected)  Severity Not Documented  Wellbutrin XL (Rash)   Medications:  (Selected)   Prescriptions  Prescribed  Pataday 0.2% ophthalmic solution: 1 drop(s), both eyes, daily, # 2.5 mL, 3 Refill(s), Type: Maintenance, Pharmacy: [facility] #[id number], 1 drop(s) Eye-Both daily  SUMAtriptan 100 mg oral tablet: = 1 tab(s) ( 100 mg ), po, daily, PRN: as needed for migraine headache, # 90 tab(s), 3 Refill(s), Type: Soft Stop, Pharmacy: [facility] #[id number], 1 tab(s) Oral daily,PRN:as needed for migraine headache  SUMAtriptan 6 mg/0.5 mL subcutaneous solution: = 0.5 mL ( 6 mg ), Subcutaneous, Once, PRN: for migraine headache, # 45 mL, 3 Refill(s), Type:  Soft Stop, [id number], 0.5 mL Subcutaneous once,PRN:for migraine headache  calcitriol 0.5 mcg oral capsule: = 2 cap(s), Oral, daily, # 180 cap(s), 3 Refill(s), Type: Maintenance, [id number], 2 cap(s) Oral daily  levothyroxine 150 mcg (0.15 mg) oral tablet: = 1 tab(s), Oral, daily, # 90 tab(s), 3 Refill(s), Type: Maintenance, [id number], Due for an appt prior to refills, 1 tab(s) Oral daily  topiramate 50 mg oral tablet: = 1 tab(s), Oral, daily, # 90 tab(s), 1 Refill(s), Type: Maintenance, [id number], 1 tab(s) Oral daily, 62.5, in, 20 8:02:00 CST, Height Measured, 145.4, lb, 20 8:02:00 CST, Weight Measured  Documented Medications  Documented  Prenatal Multivitamins: Oral, daily, 0 Refill(s), Type: Maintenance  Tums Ultra 1000 mg oral tablet, chewable: Instructions: Up to 5 tabs daily, 0 Refill(s)   Problem list:    All Problems (Selected)  Acquired hypothyroidism / SNOMED CT 756614019 / Confirmed  Hypocalcemia / SNOMED CT 7394745 / Confirmed  Migraines / SNOMED CT 73825574 / Confirmed  Mild depression / SNOMED CT 675910330 / Confirmed  Obesity / SNOMED CT 3883801937 / Probable  Hypoparathyroidism / SNOMED CT 811716625 / Confirmed  Tobacco abuse / ICD-9-.1 / Confirmed      Histories   Past Medical History:    Active  Acquired hypothyroidism (114704476)  Hypoparathyroidism (970242090)  Mild depression (179702262)  Tobacco abuse (305.1)  Hypocalcemia (1832068)  Migraines (13743244)  Resolved  *[facility]ospitalized@[facility] - Hypocalcemia: Onset on 2013 at 33 years.  Resolved on 2013 at 33 years.  Pregnancy (804793450):  Resolved in  at 18 years.  Pregnancy (061454171):  Resolved in  at 26 years.  AMA (advanced maternal age) multigravida 35+ (9846431679):  Resolved.   Family History:    Alive and well  Mother  Father     Procedure history:     delivery (SNOMED CT 5179758264) on  2016 at 36 Years.  HSG - Hysterosalpingogram (SNOMED CT 207901458) performed by Jairo Bales DO on 10/5/2015 at 35 Years.  Appendectomy (SNOMED CT 063066155) on 2010 at 30 Years.  Laparoscopic cholecystectomy (SNOMED CT 37169646) on 2010 at 30 Years.   section (SNOMED CT 12200266) on 2007 at 26 Years.  Thyroidectomy (SNOMED CT 92371412) performed by Eben Landry MD on 3/18/2003 at 23 Years.  Ts and As - Tonsillectomy and adenoidectomy (SNOMED CT 9321767300) in  at 13 Years.  Myringotomy and insertion of T tube (SNOMED CT 219667288).   Social History:        Alcohol Assessment: Current            1 x per month, 3 drinks/episode average.  5 drinks/episode maximum.      Tobacco Assessment: Current            Cigarettes, 3 per day.      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      Home and Environment Assessment            Marital status:  (Living together).  Spouse/Partner name: Ravindra.  Lives with Children, Spouse.  2               children.  Living situation: Home/Independent.      Nutrition and Health Assessment            Type of diet: Regular.      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      Other Assessment            First menses age 16.  Irregular menses.  Menstrual duration 5 days.  Cycle interval 23 days.  No history of               abnormal Pap smear.        Physical Examination   Vital Signs   2020 4:17 PM CDT Temperature Tympanic 98.3 DegF    Peripheral Pulse Rate 87 bpm    HR Method Electronic    Systolic Blood Pressure 120 mmHg    Diastolic Blood Pressure 75 mmHg    Mean Arterial Pressure 90 mmHg    BP  Site Right arm    BP Method Electronic      Measurements from flowsheet : Measurements   9/22/2020 4:17 PM CDT    Weight Measured - Standard                138.7 lb     General:  Alert and oriented, No acute distress.    Integumentary:  1 cm wart at the base of her right thumb dorsally was frozen x3 with liquid nitrogen.  She does has a very small 2 mm dark brown mole of her right cheek is very flat with smooth borders and a 3 mm slightly elevated lighter brown mole a centimeter below that  .    Neurologic:  Alert, Oriented.       Impression and Plan   Diagnosis     Benign nevus (FIX57-JG D22.9).     Wart (DFI48-EB B07.9).     Course:  Progressing as expected.    Plan:  Wound therapy, Both moles are very small look very benign so watch for any changes.  .    Patient Instructions:       Counseled: Patient, Regarding diagnosis, Activity.

## 2022-02-15 NOTE — NURSING NOTE
Comprehensive Intake Entered On:  6/29/2021 11:24 AM CDT    Performed On:  6/29/2021 11:22 AM CDT by Yady Michelle CMA               Summary   Chief Complaint :   consult per [facility] - hypocalcemia/hypoparathyroidism   Menstrual Status :   Hysterectomy   Weight Measured :   141.2 lb(Converted to: 141 lb 3 oz, 64.047 kg)    Height Measured :   62.5 in(Converted to: 5 ft 2 in, 158.75 cm)    Body Mass Index :   25.41 kg/m2 (HI)    Body Surface Area :   1.68 m2   Height/Length Estimated :   62.5 in(Converted to: 5 ft 2 in, 158.75 cm)    Systolic Blood Pressure :   125 mmHg   Diastolic Blood Pressure :   81 mmHg (HI)    Mean Arterial Pressure :   96 mmHg   Peripheral Pulse Rate :   62 bpm   Temperature Tympanic :   97.9 DegF(Converted to: 36.6 DegC)    Oxygen Saturation :   100 %   Yady Michelle CMA - 6/29/2021 11:22 AM CDT   Health Status   Allergies Verified? :   Yes   Medication History Verified? :   Yes   Immunizations Current :   Yes   Medical History Verified? :   Yes   Pre-Visit Planning Status :   Completed   Tobacco Use? :   Current every day smoker   Yady Michelle CMA - 6/29/2021 11:22 AM CDT

## 2022-02-15 NOTE — TELEPHONE ENCOUNTER
Entered by Mirela Prado CMA on January 20, 2020 11:22:17 AM CST  ---------------------  From: Mirela Prado CMA   To: [facility] #[id number]    Sent: 1/20/2020 11:22:17 AM CST  Subject: Medication Management     ** Submitted: **  Order:levothyroxine (levothyroxine 150 mcg (0.15 mg) oral tablet)  1 tab(s)  Oral  daily  Qty:  30 tab(s)        Refills:  0          Substitutions Allowed     Route To [facility] #[id number]    Signed by Mirela Prado CMA  1/20/2020 11:21:00 AM    ** Submitted: **  Complete:levothyroxine (levothyroxine 150 mcg (0.15 mg) oral capsule)   Signed by Mirela Prado CMA  1/20/2020 11:22:00 AM    ** Not Approved:  **  levothyroxine (LEVOTHYROXINE 0.150MG (150MCG) TAB)  TAKE 1 TABLET BY MOUTH DAILY NEED APPOINTMENT FOR FURTHER REFILLS.  Qty:  90 tab(s)        Days Supply:  30        Refills:  0          Substitutions Allowed     Route To [facility] #[id number]   Signed by Mirela Prado CMA            ------------------------------------------  From: [facility] #[id number]  To: Vladimir Mahajan MD  Sent: January 19, 2020 3:22:00 AM CST  Subject: Medication Management  Due: January 20, 2020 3:22:00 AM CST    ** On Hold Pending Signature **  Drug: levothyroxine (levothyroxine 150 mcg (0.15 mg) oral tablet)  TAKE 1 TABLET BY MOUTH DAILY NEED APPOINTMENT FOR FURTHER REFILLS.  Quantity: 90 tab(s)  Days Supply: 30  Refills: 0  Substitutions Allowed  Notes from Pharmacy:     Dispensed Drug: levothyroxine (levothyroxine 150 mcg (0.15 mg) oral tablet)  TAKE 1 TABLET BY MOUTH DAILY NEED APPOINTMENT FOR FURTHER REFILLS.  Quantity: 90 tab(s)  Days Supply: 30  Refills: 0  Substitutions Allowed  Notes from Pharmacy:   ------------------------------------------Date of last office visit and reason:  2/26/19      Date of last Med Check / Px:   2/26/19  Date of last labs pertaining to med:  2/26/19    RTC order in chart:  yes     For Protocol refill, has patient been  contacted:  yes, called and LM letting patient know that they are due for an appt and lab work with PCP. Refilled for 30 days 0 Refills

## 2022-02-15 NOTE — TELEPHONE ENCOUNTER
---------------------  From: Vinh Moyer PA-C   To: ERICKSON CANALESMIKE TIM    Sent: 1/24/2020 3:30:16 PM CST  Subject: Patient Message - Results Notification        Results are stable. Calcium almost normal. Continue current treatment plan. Let us know how the Topamax works.    Results:  Date Result Name Ind Value Ref Range   1/23/2020 8:41 AM Sodium Level  141 mmol/L (135 - 146)   1/23/2020 8:41 AM Potassium Level  4.1 mmol/L (3.5 - 5.3)   1/23/2020 8:41 AM Chloride Level  104 mmol/L (98 - 110)   1/23/2020 8:41 AM CO2 Level  26 mmol/L (20 - 32)   1/23/2020 8:41 AM Glucose Level  94 mg/dL (65 - 99)   1/23/2020 8:41 AM BUN  11 mg/dL (7 - 25)   1/23/2020 8:41 AM Creatinine Level  1.03 mg/dL (0.50 - 1.10)   1/23/2020 8:41 AM Calcium Level  8.6 mg/dL (8.6 - 10.2)   1/23/2020 8:41 AM Calcium Level ((L)) 8.5 mg/dL (8.6 - 10.2)   1/23/2020 8:41 AM Calcium Ionized ((L)) 4.3 mg/dL (4.8 - 5.6)   1/23/2020 8:41 AM PTH Intact ((L)) <1 pg/mL (14 - 64)   1/23/2020 8:41 AM Bilirubin Total  0.5 mg/dL (0.2 - 1.2)   1/23/2020 8:41 AM Alkaline Phosphatase  51 unit/L (33 - 115)   1/23/2020 8:41 AM AST/SGOT  12 unit/L (10 - 30)   1/23/2020 8:41 AM ALT/SGPT  11 unit/L (6 - 29)   1/23/2020 8:41 AM Protein Total  6.9 gm/dL (6.1 - 8.1)   1/23/2020 8:41 AM Albumin Level  4.5 gm/dL (3.6 - 5.1)   1/23/2020 8:41 AM Globulin  2.4 (1.9 - 3.7)   1/23/2020 8:41 AM A/G Ratio  1.9 (1.0 - 2.5)   1/23/2020 8:41 AM Cholesterol  168 mg/dL ( - <200)   1/23/2020 8:41 AM Non-HDL Cholesterol  102 ( - <130)   1/23/2020 8:41 AM HDL  66 mg/dL (>50 - )   1/23/2020 8:41 AM Cholesterol/HDL Ratio  2.5 ( - <5.0)   1/23/2020 8:41 AM LDL  85    1/23/2020 8:41 AM Triglyceride  78 mg/dL ( - <150)   1/23/2020 8:41 AM T4 Free  1.7 ng/dL (0.8 - 1.8)   1/23/2020 8:41 AM T3 Free  3.1 pg/mL (2.3 - 4.2)   1/23/2020 8:41 AM TSH ((L)) 0.05 mIU/L    1/23/2020 8:41 AM WBC  6.4 (3.8 - 10.8)   1/23/2020 8:41 AM RBC  4.88 (3.80 - 5.10)   1/23/2020 8:41 AM Hgb  13.3 gm/dL (11.7 - 15.5)    1/23/2020 8:41 AM Hct  41.2 % (35.0 - 45.0)   1/23/2020 8:41 AM MCV  84.4 fL (80.0 - 100.0)   1/23/2020 8:41 AM MCH  27.3 pg (27.0 - 33.0)   1/23/2020 8:41 AM MCHC  32.3 gm/dL (32.0 - 36.0)   1/23/2020 8:41 AM RDW  13.5 % (11.0 - 15.0)   1/23/2020 8:41 AM Platelet  337 (140 - 400)   1/23/2020 8:41 AM MPV  11.3 fL (7.5 - 12.5)

## 2022-02-15 NOTE — PROGRESS NOTES
Patient:   INGRID CANALES            MRN: [id number]            FIN: [id number]               Age:   38 years     Sex:  Female     :  1980   Associated Diagnoses:   Acquired hypothyroidism; Hypocalcemia; Hypoparathyroidism; Migraines; Pre-operative general physical examination   Author:   Vladimir Mahajan MD      Preoperative Information   Indication for surgery:  Dysfunctional uterine bleeding.         Associated symptom: Dysmenorrhea.    Accompanied by:  No one.    Source of history:  Self.    Referral source:  Vladimir Mahajan MD.    History limitation:  None.       Chief Complaint   2018 9:40 AM CDT     Pre-op DOS 18, Dr. Albert Gutierrez, Hysterectomy      Review of Systems   Constitutional:  Mild fatigue, Weight gain, No fever, No chills, No sweats, No weakness.    Eye:  Negative.    Ear/Nose/Mouth/Throat:  Negative.    Respiratory:  Negative.    Cardiovascular:  Negative.    Breast:  Negative.    Gastrointestinal:  Negative.    Genitourinary:  Negative.    Gynecologic:  Dysmenorrhea.    Hematology/Lymphatics:  Negative.    Endocrine:  Negative.    Immunologic:  Negative.    Musculoskeletal:  Negative.    Integumentary:  Negative.    Neurologic:  Alert and oriented X4.    Psychiatric:  Negative.    ROS reviewed as documented in chart      Health Status   Allergies:    Allergic Reactions (Selected)  Severity Not Documented  Rondec (Rash)  Nonallergic Reactions (Selected)  Severity Not Documented  Wellbutrin XL (Rash)   Medications:  (Selected)   Prescriptions  Prescribed  Metoprolol Tartrate 25 mg oral tablet: 1 tab(s) ( 25 mg ), po, daily, # 90 tab(s), 3 Refill(s), Type: Maintenance, Pharmacy: [facility] #[id number], 1 tab(s) po daily  Pataday 0.2% ophthalmic solution: 1 drop(s), both eyes, daily, # 2.5 mL, 1 Refill(s), Type: Maintenance, Pharmacy: [facility] #[id number], 1 drop(s) both eyes daily  SUMAtriptan 100 mg oral tablet: 1 tab(s) ( 100 mg ), po, daily, PRN: as needed for migraine  headache, # 9 tab(s), 11 Refill(s), Type: Soft Stop, Pharmacy: [facility] #[id number], 1 tab(s) po daily,PRN:as needed for migraine headache  calcitriol 0.5 mcg oral capsule: 2 cap(s) ( 1 mcg ), po, daily, # 180 cap(s), 3 Refill(s), Type: Maintenance, Pharmacy: [facility] #[id number], 2 cap(s) po daily  cyclobenzaprine 10 mg oral tablet: 1 tab(s) ( 10 mg ), po, tid, # 30 tab(s), Type: Soft Stop, Pharmacy: [facility] #[id number]  levothyroxine 150 mcg (0.15 mg) oral capsule: 1 cap(s) ( 150 mcg ), po, daily, Instructions: Needs appt for further refills, # 90 cap(s), 3 Refill(s), Type: Maintenance, Pharmacy: [facility] #[id number], 1 cap(s) po daily,Instr:Needs appt for further refills  Documented Medications  Documented  Tums Ultra 1000 mg oral tablet, chewable: Instructions: Up to 5 tabs daily, 0 Refill(s),    Medications          *denotes recorded medication          SUMAtriptan 100 mg oral tablet: 100 mg, 1 tab(s), po, daily, PRN: as needed for migraine headache, 9 tab(s), 11 Refill(s).          calcitriol 0.5 mcg oral capsule: 1 mcg, 2 cap(s), po, daily, 180 cap(s), 3 Refill(s).          *Tums Ultra 1000 mg oral tablet, chewable: Up to 5 tabs daily.          cyclobenzaprine 10 mg oral tablet: 10 mg, 1 tab(s), po, tid, 30 tab(s).          levothyroxine 150 mcg (0.15 mg) oral capsule: 150 mcg, 1 cap(s), po, daily, Needs appt for further refills, 90 cap(s), 3 Refill(s).          Metoprolol Tartrate 25 mg oral tablet: 25 mg, 1 tab(s), po, daily, 90 tab(s), 3 Refill(s).          Pataday 0.2% ophthalmic solution: 1 drop(s), both eyes, daily, 2.5 mL, 1 Refill(s).     Problem list:    All Problems (Selected)  Acquired hypothyroidism / SNOMED CT 773142731 / Confirmed  Hypocalcemia / SNOMED CT 2745363 / Confirmed  Migraines / SNOMED CT 78092525 / Confirmed  Mild depression / SNOMED CT 288283937 / Confirmed  Obesity / SNOMED CT 5512359971 / Probable  Hypoparathyroidism / SNOMED CT 765946921 / Confirmed  Tobacco abuse / ICD-9-CM  305.1 / Confirmed      Histories   Past Medical History:    Active  Acquired hypothyroidism (481343658)  Hypoparathyroidism (117204922)  Mild depression (708475749)  Tobacco abuse (305.1)  Hypocalcemia (7518598)  Resolved  *Hospitalized@[facility] - Hypocalcemia: Onset on 2013 at 33 years.  Resolved on 2013 at 33 years.  Pregnancy (118278624):  Resolved in  at 18 years.  Pregnancy (109117459):  Resolved in  at 26 years.  AMA (advanced maternal age) multigravida 35+ (1643754986):  Resolved.   Family History:    Alive and well  Mother  Father     Procedure history:     delivery (0282293539) on 2016 at 36 Years.  HSG - Hysterosalpingogram (061363220) on 10/5/2015 at 35 Years.  Appendectomy (602187409) on 2010 at 30 Years.  Laparoscopic cholecystectomy (29895662) on 2010 at 30 Years.   section (93324938) on 2007 at 26 Years.  Thyroidectomy (33350892) on 3/18/2003 at 23 Years.  Ts and As - Tonsillectomy and adenoidectomy (4571411700) in  at 13 Years.  Myringotomy and insertion of T tube (528524244).   Social History:        Alcohol Assessment: Current            1 x per month, 3 drinks/episode average.  5 drinks/episode maximum.      Tobacco Assessment: Current            Cigarettes, 3 per day.      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      Home and Environment Assessment            Marital status:  (Living together).  Spouse/Partner name: Ravindra.  Lives with Children, Spouse.  2               children.  Living situation: Home/Independent.      Nutrition and Health Assessment            Type of diet: Regular.      Exercise and Physical Activity Assessment: Does not exercise                     Comments:                      2013 - [place] MONTALVO  [employer]                     No regular exercise.      Other Assessment            First menses age 14.        Physical Examination   Vital Signs   9/5/2018 9:40 AM CDT Temperature Tympanic 97.5 DegF  LOW    Peripheral Pulse Rate 64 bpm    Pulse Site Radial artery    HR Method Manual    Systolic Blood Pressure 118 mmHg    Diastolic Blood Pressure 68 mmHg    Mean Arterial Pressure 85 mmHg    BP Site Right arm    BP Method Manual      Measurements from flowsheet : Measurements   9/5/2018 9:40 AM CDT Height Measured - Standard 63 in    Weight Measured - Standard 148 lb    BSA 1.73 m2    Body Mass Index 26.21 kg/m2  HI      General:  Alert and oriented.    Eye:  Pupils are equal, round and reactive to light.    HENT:  Normocephalic.    Neck:  Supple.    Respiratory:  Lungs are clear to auscultation.    Cardiovascular:  Normal rate, Regular rhythm, No murmur.    Gastrointestinal:  Soft, Non-tender.    Genitourinary:  deferred to GYN.    Integumentary:  Warm, Dry.    Neurologic:  Alert, Oriented.    Psychiatric:  Cooperative, Appropriate mood & affect.       Review / Management   Results review:  Lab results   7/16/2018 3:46 PM CDT Sed Rate 6    Chlam/GC Comments See comment    Chlam/GC Comments See comment    Chlamydia RNA NOT DETECTED    GC RNA NOT DETECTED   .       Impression and Plan   Diagnosis     Acquired hypothyroidism (ZOY19-VV E03.8).     Acquired hypothyroidism (EAC28-MC E03.8).     Hypocalcemia (LFD23-TP E83.51).     Hypocalcemia (AAC81-VH E83.51).     Hypoparathyroidism (APC89-TJ E89.2).     Hypoparathyroidism (KMN05-UG E89.2).     Migraines (CWY48-BK G43.909).     Migraines (MCH68-MD G43.909).     Pre-operative general physical examination (KWH75-QM Z01.818).     Condition:  Stable.    Orders     Orders (Selected)   Outpatient Orders  Ordered (Dispatched)  CBC (h/h, RBC, indices, WBC, Plt)* ([facility]): Specimen Type: Blood, Collection Date: 09/05/18 10:08:00 CDT  Comprehensive Metabolic Panel* ([facility]): Specimen  Type: Serum, Collection Date: 09/05/18 10:08:00 CDT  TSH* ([facility]): Specimen Type: Serum, Collection Date: 09/05/18 10:08:00 CDT.     Orders     Patient seen and examined and treated by myself. Note prepared by student and reviewed.  .     Patient Instructions:  May proceed with anticipated surgery, risk vs benefits discussed.  OK for general endotracheal anesthesia., Pending Lab Work, Note patient is on chronic beta blockers for migraine prophylaxis.

## 2022-02-15 NOTE — TELEPHONE ENCOUNTER
---------------------  From: Uche Horn (T Message Pool ([id number]_[facility]))   To: Keyshawn LIRIANO McRae [place];     Sent: 11/16/2020 1:47:13 PM CST  Subject: FW: FW: Work Leave           ---------------------  From: INGRID CANALES  To: T Message Pool ([id number])  Sent: 11/16/2020 01:37 p.m. CST  Subject: RE: FW: Work Leave  Symptoms are the same.  Headaches come and go- couple days at a time.  Meds are not helping with the headache.  I can go back to work with a sore throat and slight cough, but the headache is the main problem. No fever or other symptoms.       Addendum by Uche Horn on November 16, 2020 1:20:37 PM CST  ---------------------  From: Uche Horn (CHT Message Pool ([id number]))  To: INGRID CANALES  Sent: 11/16/2020 1:20:37 PM CST  Subject: FW: Work Leave  ---------------------  From: Kareen Cook RN (Phone Messages Pool ([id number]))  To: T Message Pool ([id number]); INGRID CANALES  Sent: 11/16/2020 1:12:46 PM CST  Subject: RE: Work Leave       Angélica Muhammad,       Have you been tested for COVID? When did your symptoms start? Are your symptoms same, improving, or worsening?       Dr. Mahajan may want you to have an appointment to discuss your symptoms and get tested. I will forward your message.       Thank you,       Kareen Cook RN            ---------------------  From: INGRID CANALES  To: [facility]  Sent: 11/16/2020 12:26 p.m. CST  Subject: Work Leave  I need a Dr note faxed to [phone] that includes my name and birthdate and an ok to return to work date that states that I will be off through at least the 19th (supposed to work the 17th and 18th night shifts) due to possible Covid symptoms after a known exposure.  My symptoms are a severe headache, cough and sore throat, but they do not need to be included in my work letter.  I plan to follow up if my symptoms worsen or continue to  not improve. Thanks!---------------------  From: Vladimir Mahajan MD   To: [facility] Message Pool ([id number]_[facility]);     Sent: 11/16/2020 1:48:45 PM CST  Subject: RE: FW: Work Leave     Needs video visit.  Can work in today.Message sent to appointment pool to call and schedule video visit.

## 2022-02-15 NOTE — NURSING NOTE
Comprehensive Intake Entered On:  9/22/2020 4:21 PM CDT    Performed On:  9/22/2020 4:17 PM CDT by Davida Martin CMA               Summary   Chief Complaint :   pt here today for wart removal on hand, check moles.    Menstrual Status :   Menarcheal   Weight Measured :   138.7 lb(Converted to: 138 lb 11 oz, 62.91 kg)    Systolic Blood Pressure :   120 mmHg   Diastolic Blood Pressure :   75 mmHg   Mean Arterial Pressure :   90 mmHg   Peripheral Pulse Rate :   87 bpm   BP Site :   Right arm   BP Method :   Electronic   HR Method :   Electronic   Temperature Tympanic :   98.3 DegF(Converted to: 36.8 DegC)    Davida Martin CMA - 9/22/2020 4:17 PM CDT   Health Status   Allergies Verified? :   Yes   Medication History Verified? :   Yes   Immunizations Current :   Yes   Pre-Visit Planning Status :   N/A   Tobacco Use? :   Current every day smoker   Davida Martin CMA - 9/22/2020 4:17 PM CDT   Consents   Consent for Immunization Exchange :   Consent Granted   Consent for Immunizations to Providers :   Consent Granted   Davida Martin CMA - 9/22/2020 4:17 PM CDT   Meds / Allergies   (As Of: 9/22/2020 4:21:47 PM CDT)   Allergies (Active)   Rondec  Estimated Onset Date:   Unspecified ; Reactions:   rash ; Created By:   Connie Munoz CMA; Reaction Status:   Active ; Category:   Drug ; Substance:   Rondec ; Type:   Allergy ; Updated By:   Connie Munoz CMA; Reviewed Date:   1/23/2020 8:05 AM CST      Wellbutrin XL  Estimated Onset Date:   <not entered> 2011 ; Reactions:   Rash ; Created By:   Liane Townsend LPN; Reaction Status:   Active ; Category:   Drug ; Substance:   Wellbutrin XL ; Type:   Side Effect ; Updated By:   Liane Townsend LPN; Reviewed Date:   1/23/2020 8:05 AM CST        Medication List   (As Of: 9/22/2020 4:21:47 PM CDT)   Prescription/Discharge Order    calcitriol  :   calcitriol ; Status:   Prescribed ; Ordered As Mnemonic:   calcitriol 0.5 mcg oral capsule ; Simple Display Line:   2 cap(s), Oral, daily, 180  cap(s), 3 Refill(s) ; Ordering Provider:   Vinh Moyer PA-C; Catalog Code:   calcitriol ; Order Dt/Tm:   1/23/2020 8:22:25 AM CST          levothyroxine  :   levothyroxine ; Status:   Prescribed ; Ordered As Mnemonic:   levothyroxine 150 mcg (0.15 mg) oral tablet ; Simple Display Line:   1 tab(s), Oral, daily, 90 tab(s), 3 Refill(s) ; Ordering Provider:   Vinh Moyer PA-C; Catalog Code:   levothyroxine ; Order Dt/Tm:   1/23/2020 8:22:24 AM CST          olopatadine ophthalmic  :   olopatadine ophthalmic ; Status:   Prescribed ; Ordered As Mnemonic:   Pataday 0.2% ophthalmic solution ; Simple Display Line:   1 drop(s), both eyes, daily, 2.5 mL, 3 Refill(s) ; Ordering Provider:   Vinh Moyer PA-C; Catalog Code:   olopatadine ophthalmic ; Order Dt/Tm:   1/23/2020 8:22:23 AM CST          SUMAtriptan  :   SUMAtriptan ; Status:   Prescribed ; Ordered As Mnemonic:   SUMAtriptan 100 mg oral tablet ; Simple Display Line:   100 mg, 1 tab(s), po, daily, PRN: as needed for migraine headache, 90 tab(s), 3 Refill(s) ; Ordering Provider:   Vinh Moyer PA-C; Catalog Code:   SUMAtriptan ; Order Dt/Tm:   1/23/2020 8:22:25 AM CST          SUMAtriptan  :   SUMAtriptan ; Status:   Prescribed ; Ordered As Mnemonic:   SUMAtriptan 6 mg/0.5 mL subcutaneous solution ; Simple Display Line:   6 mg, 0.5 mL, Subcutaneous, Once, PRN: for migraine headache, 45 mL, 3 Refill(s) ; Ordering Provider:   Vinh Moyer PA-C; Catalog Code:   SUMAtriptan ; Order Dt/Tm:   1/23/2020 8:22:26 AM CST          topiramate  :   topiramate ; Status:   Prescribed ; Ordered As Mnemonic:   topiramate 50 mg oral tablet ; Simple Display Line:   1 tab(s), Oral, daily, 90 tab(s), 1 Refill(s) ; Ordering Provider:   Vinh Moyer PA-C; Catalog Code:   topiramate ; Order Dt/Tm:   9/3/2020 12:31:06 PM CDT            Home Meds    calcium carbonate  :   calcium carbonate ; Status:   Documented ; Ordered As Mnemonic:   Tums Ultra 1000 mg oral tablet, chewable ;  Simple Display Line:   Up to 5 tabs daily ; Catalog Code:   calcium carbonate ; Order Dt/Tm:   11/11/2010 1:34:11 PM CST          multivitamin, prenatal  :   multivitamin, prenatal ; Status:   Documented ; Ordered As Mnemonic:   Prenatal Multivitamins ; Simple Display Line:   Oral, daily, 0 Refill(s) ; Catalog Code:   multivitamin, prenatal ; Order Dt/Tm:   1/23/2020 8:07:03 AM CST            ID Risk Screen   Recent Travel History :   No recent travel   Family Member Travel History :   No recent travel   Other Exposure to Infectious Disease :   Unknown   Davida Martin CMA - 9/22/2020 4:17 PM CDT

## 2022-02-15 NOTE — TELEPHONE ENCOUNTER
---------------------  From: Kareen Cook RN (Phone Messages Pool ([id number]_[facility]))   To: INGRID CANALES    Sent: 11/16/2020 1:56:56 PM CST  Subject: RE: Work Leave     Dr. Franco would like to have you do a video visit to discuss symptoms.    Please call and schedule that as soon as you can.    Thanks,    Kareen Cook RN      ---------------------  From: INGRID CANALES  To: [facility]  Sent: 11/16/2020 01:25 p.m. CST  Subject: RE: Work Leave  I think it was the 3rd I started with the headaches.  Sore throat and cough in the last week or so.  I have been tested, with negative results.  Just still don t feel 100%.  ---------------------  From: Kareen Cook RN (Phone Messages Pool ([id number]_[facility]))  To: [facility] Message Pool ([id number]_[facility]); INGRID CANALES  Sent: 11/16/2020 1:12:46 PM CST  Subject: RE: Work Leave       Angélica Muhammad,       Have you been tested for COVID? When did your symptoms start? Are your symptoms same, improving, or worsening?       Dr. Mahajan may want you to have an appointment to discuss your symptoms and get tested. I will forward your message.       Thank you,       Kareen Cook RN            ---------------------  From: INGRID CANALES  To: [facility]  Sent: 11/16/2020 12:26 p.m. CST  Subject: Work Leave  I need a Dr note faxed to [phone] that includes my name and birthdate and an ok to return to work date that states that I will be off through at least the 19th (supposed to work the 17th and 18th night shifts) due to possible Covid symptoms after a known exposure.  My symptoms are a severe headache, cough and sore throat, but they do not need to be included in my work letter.  I plan to follow up if my symptoms worsen or continue to not improve. Thanks!

## 2022-02-15 NOTE — TELEPHONE ENCOUNTER
Entered by Ivis Pacheco CMA on February 21, 2019 4:12:15 PM CST  ---------------------  From: Ivis Pacheco CMA   To: [facility] [id number]    Sent: 2/21/2019 4:12:14 PM CST  Subject: Medication Management     ** Submitted: **  Order:calcitriol (calcitriol 0.5 mcg oral capsule)  2 cap(s)  Oral  daily  Qty:  60 cap(s)        Days Supply:  30        Refills:  0          Substitutions Allowed     Route To [facility]    Signed by Ivis Pacheco CMA  2/21/2019 4:12:00 PM    ** Submitted: **  Complete:calcitriol (calcitriol 0.5 mcg oral capsule)   Signed by Ivis Pacheco CMA  2/21/2019 4:12:00 PM    ** Not Approved:  **  calcitriol (CALCITRIOL 0.5MCG CAPSULES)  TAKE 2 CAPSULES BY MOUTH DAILY  Qty:  60 cap(s)        Days Supply:  30        Refills:  0          Substitutions Allowed     Route To [facility] [id number]   Signed by Ivis Pacheco CMA            ** Patient matched by Ivis Pacheco CMA on 2/21/2019 4:10:37 PM CST **      ------------------------------------------  From: [facility]  To: Sammy Jensen MD  Sent: February 21, 2019 3:29:59 PM CST  Subject: Medication Management  Due: February 22, 2019 3:29:59 PM CST    ** On Hold Pending Signature **  Drug: calcitriol (calcitriol 0.5 mcg oral capsule)  TAKE 2 CAPSULES BY MOUTH DAILY  Quantity: 60 cap(s)     Days Supply: 30        Refills: 0  Substitutions Allowed  Notes from Pharmacy:     Dispensed Drug: calcitriol (calcitriol 0.5 mcg oral capsule)  TAKE 2 CAPSULES BY MOUTH DAILY  Quantity: 60 cap(s)     Days Supply: 30        Refills: 0  Substitutions Allowed  Notes from Pharmacy:   ------------------------------------------PCP: [facility]  last seen: 9/5/18 preop  last filled: 3/15/18 180, 3refills  RTC: March 2019

## 2022-02-15 NOTE — TELEPHONE ENCOUNTER
---------------------  From: Ivis Pacheco CMA (Phone Messages Pool ([id number]_[facility]))   To: Vinh Moyer PA-C;     Sent: 2/18/2020 2:18:47 PM CST  Subject: FW: Topiramate           ---------------------  From: INGRID CANALES  To: [facility]  Sent: 02/18/2020 02:15 p.m. CST  Subject: Topiramate  Would like my Topiramate 25mg increased to 50mg as discussed with Vinh.  I currently have 5 days of my 25mg prescription left. Sent to [facility] in  would be great. Thank you!---------------------  From: Vinh Moyer PA-C   To: Phone [employer] Pool ([id number]_[facility]);     Sent: 2/18/2020 2:26:16 PM CST  Subject: RE: Topiramate     I sent 50 mg in for #90    KAH---------------------  From: Ivis Pacheco CMA (Phone Messages Pool ([id number]_[facility]))   To: INGRID CANALES    Sent: 2/18/2020 2:27:36 PM CST  Subject: FW: Topiramate

## 2022-02-15 NOTE — TELEPHONE ENCOUNTER
---------------------  From: Rohan Bunn MD   To: SABINA CANALES    Sent: 6/19/2021 8:58:48 AM CDT  Subject: Labs     Dear Sabina  Calcium and creatinine are improved  Rest of labs are good      Results:  Date Result Name Ind Value Ref Range   6/18/2021 9:27 AM Sodium Level  139 mmol/L (135 - 146)   6/18/2021 9:27 AM Potassium Level  3.8 mmol/L (3.5 - 5.3)   6/18/2021 9:27 AM Chloride Level  104 mmol/L (98 - 110)   6/18/2021 9:27 AM CO2 Level  27 mmol/L (20 - 32)   6/18/2021 9:27 AM Glucose Level ((H)) 111 mg/dL (65 - 99)   6/18/2021 9:27 AM BUN  11 mg/dL (7 - 25)   6/18/2021 9:27 AM Creatinine Level ((H)) 1.15 mg/dL (0.50 - 1.10)   6/18/2021 9:27 AM BUN/Creat Ratio  10 (6 - 22)   6/18/2021 9:27 AM eGFR ((L)) 59 mL/min/1.73m2 (> OR = 60 - )   6/18/2021 9:27 AM eGFR African American  69 mL/min/1.73m2 (> OR = 60 - )   6/18/2021 9:27 AM Calcium Level ((L)) 6.9 mg/dL (8.6 - 10.2)   6/18/2021 9:27 AM Bilirubin Total  0.3 mg/dL (0.2 - 1.2)   6/18/2021 9:27 AM Bilirubin Direct  0.1 mg/dL ( - < OR = 0.2)   6/18/2021 9:27 AM Bilirubin Indirect  0.2 (0.2 - 1.2)   6/18/2021 9:27 AM Alkaline Phosphatase  38 unit/L (31 - 125)   6/18/2021 9:27 AM AST/SGOT  14 unit/L (10 - 30)   6/18/2021 9:27 AM ALT/SGPT  11 unit/L (6 - 29)   6/18/2021 9:27 AM Protein Total  6.1 gm/dL (6.1 - 8.1)   6/18/2021 9:27 AM Albumin Level  4.0 gm/dL (3.6 - 5.1)   6/18/2021 9:27 AM Globulin  2.1 (1.9 - 3.7)   6/18/2021 9:27 AM A/G Ratio  1.9 (1.0 - 2.5)     Edwin Bunn M.D.

## 2022-02-15 NOTE — PROGRESS NOTES
Patient:   INGRID CANALES            MRN: [id number]            FIN: [id number]               Age:   38 years     Sex:  Female     :  1980   Associated Diagnoses:   Dysmenorrhea   Author:   Vladimir Mahajan MD      Chief Complaint   8/10/2018 11:26 AM CDT   FMLA paperwork     Chief complaint and symptoms noted above confirmed with patient.      Subjective   Chief complaint 8/10/2018 11:26 AM CDT   FMLA paperwork  .  Additional information Getting Hysterectomy next month.        Health Status   Allergies:    Allergic Reactions (Selected)  Severity Not Documented  Rondec (Rash)  Nonallergic Reactions (Selected)  Severity Not Documented  Wellbutrin XL (Rash)   Medications:  (Selected)   Prescriptions  Prescribed  Metoprolol Tartrate 25 mg oral tablet: 1 tab(s) ( 25 mg ), po, daily, # 90 tab(s), 3 Refill(s), Type: Maintenance, Pharmacy: [facility] #[id number], 1 tab(s) po daily  Pataday 0.2% ophthalmic solution: 1 drop(s), both eyes, daily, # 2.5 mL, 1 Refill(s), Type: Maintenance, Pharmacy: [facility] #[id number], 1 drop(s) both eyes daily  SUMAtriptan 100 mg oral tablet: 1 tab(s) ( 100 mg ), po, daily, PRN: as needed for migraine headache, # 9 tab(s), 11 Refill(s), Type: Soft Stop, Pharmacy: [facility] #[id number], 1 tab(s) po daily,PRN:as needed for migraine headache  calcitriol 0.5 mcg oral capsule: 2 cap(s) ( 1 mcg ), po, daily, # 180 cap(s), 3 Refill(s), Type: Maintenance, Pharmacy: [facility] #[id number], 2 cap(s) po daily  cyclobenzaprine 10 mg oral tablet: 1 tab(s) ( 10 mg ), po, tid, # 30 tab(s), Type: Soft Stop, Pharmacy: [id number]  levothyroxine 150 mcg (0.15 mg) oral capsule: 1 cap(s) ( 150 mcg ), po, daily, Instructions: Needs appt for further refills, # 90 cap(s), 3 Refill(s), Type: Maintenance, Pharmacy: [facility] #[id number], 1 cap(s) po daily,Instr:Needs appt for further refills  Documented Medications  Documented  Tums Ultra 1000 mg oral tablet, chewable: Instructions: Up to 5 tabs  daily, 0 Refill(s)   Problem list:    All Problems (Selected)  Acquired hypothyroidism / SNOMED CT 628368456 / Confirmed  Hypocalcemia / SNOMED CT 8378768 / Confirmed  Migraines / SNOMED CT 37622244 / Confirmed  Mild depression / SNOMED CT 073048171 / Confirmed  Obesity / SNOMED CT 0750309834 / Probable  Hypoparathyroidism / SNOMED CT 823611499 / Confirmed  Tobacco abuse / ICD-9-.1 / Confirmed      Objective   Vital Signs   8/10/2018 11:26 AM CDT Peripheral Pulse Rate 74 bpm    Pulse Site Radial artery    HR Method Manual    Systolic Blood Pressure 112 mmHg    Diastolic Blood Pressure 68 mmHg    Mean Arterial Pressure 83 mmHg    BP Site Right arm    BP Method Electronic      Measurements from flowsheet : Measurements   8/10/2018 11:26 AM CDT Height Measured - Standard 63 in    Weight Measured - Standard 146.4 lb    BSA 1.72 m2    Body Mass Index 25.93 kg/m2  HI      General:  Alert and oriented, No acute distress.    Respiratory:  Lungs are clear to auscultation.    Cardiovascular:  Normal rate.       Results Review   Results review   Lab results   7/16/2018 3:46 PM CDT Sed Rate 6    Chlam/GC Comments See comment    Chlam/GC Comments See comment    Chlamydia RNA NOT DETECTED    GC RNA NOT DETECTED         Impression and Plan   Assessment and Plan:          Diagnosis: Dysmenorrhea (MYX80-SE N94.6).         Course: Progressing as expected.    Orders     Forms filled out, see copy.     .    Assessment and Plan:  Orders     More than half of a 15 minute visit spent on counseling the above problems..     .

## 2022-02-15 NOTE — TELEPHONE ENCOUNTER
Entered by Vinh Moyer PA-C on September 14, 2021 7:39:44 AM CDT  ---------------------  From: Vinh Moyer PA-C   To: [id number]    Sent: 9/14/2021 7:39:44 AM CDT  Subject: Medication Management     ** Submitted: **  Complete:topiramate (topiramate 50 mg oral tablet)   Signed by Vinh Moyer PA-C  9/14/2021 12:39:00 PM [facility]    ** Approved with modifications: **  topiramate (TOPIRAMATE 50MG TABLETS)  TAKE 1 TABLET BY MOUTH DAILY  Qty:  90 tab(s)        Days Supply:  90        Refills:  0          Substitutions Allowed     Route To Pharmacy - [facility] #[id number]               ------------------------------------------  From: [id number]  To: Vinh Moyer PA-C  Sent: September 10, 2021 1:53:15 PM CDT  Subject: Medication Management  Due: August 20, 2021 11:16:59 AM CDT     ** On Hold Pending Signature **     Dispensed Drug: topiramate (topiramate 50 mg oral tablet), TAKE 1 TABLET BY MOUTH DAILY  Quantity: 90 tab(s)  Days Supply: 90  Refills: 0  Substitutions Allowed  Notes from Pharmacy:  ------------------------------------------

## 2022-02-15 NOTE — LETTER
(Inserted Image. Unable to display)   August 11, 2021  INGRID CANALES  [address] [place] [address]          Dear INGRID,      Thank you for selecting [facility] for your healthcare needs.    Our records indicate you are due for the following services:     Non-Fasting Labs    If you had your labs done at another facility or with Direct Access Lab Testing at [facility], please bring in a copy of the results to your next visit, mail a copy, or drop off a copy of your results to your Healthcare Provider.    (FYI   Regarding office visits: In some instances, a video visit or telephone visit may be offered as an option.)        To schedule an appointment or if you have further questions, please contact your clinic at [phone].      Powered by [employer]    Sincerely,    Jr Harris M.D.

## 2022-02-15 NOTE — NURSING NOTE
Comprehensive Intake Entered On:  6/21/2021 10:01 AM CDT    Performed On:  6/21/2021 9:57 AM CDT by Kisha Tran CMA               Summary   Chief Complaint :   Follow up hypocalcemia    Menstrual Status :   Hysterectomy   Weight Measured :   140.7 lb(Converted to: 140 lb 11 oz, 63.820 kg)    Height Measured :   62.5 in(Converted to: 5 ft 2 in, 158.75 cm)    Body Mass Index :   25.32 kg/m2 (HI)    Body Surface Area :   1.68 m2   Height/Length Estimated :   62.5 in(Converted to: 5 ft 2 in, 158.75 cm)    Systolic Blood Pressure :   110 mmHg   Diastolic Blood Pressure :   62 mmHg   Mean Arterial Pressure :   78 mmHg   Peripheral Pulse Rate :   78 bpm   BP Site :   Right arm   Pulse Site :   Radial artery   BP Method :   Manual   HR Method :   Manual   Temperature Tympanic :   97.9 DegF(Converted to: 36.6 DegC)    Kisha Tran CMA - 6/21/2021 9:57 AM CDT   Health Status   Allergies Verified? :   Yes   Medication History Verified? :   Yes   Immunizations Current :   Yes   Medical History Verified? :   No   Pre-Visit Planning Status :   Completed   Tobacco Use? :   Current every day smoker   Kisha Tran CMA - 6/21/2021 9:57 AM CDT   Consents   Consent for Immunization Exchange :   Consent Granted   Consent for Immunizations to Providers :   Consent Granted   Kisha Tran CMA - 6/21/2021 9:57 AM CDT   Meds / Allergies   (As Of: 6/21/2021 10:01:40 AM CDT)   Allergies (Active)   Rondec  Estimated Onset Date:   Unspecified ; Reactions:   rash ; Created By:   Connie Munoz CMA; Reaction Status:   Active ; Category:   Drug ; Substance:   Rondec ; Type:   Allergy ; Updated By:   Connie Munoz CMA; Reviewed Date:   6/21/2021 9:57 AM CDT      Wellbutrin XL  Estimated Onset Date:   <not entered> 2011 ; Reactions:   Rash ; Created By:   Liane Townsend LPN; Reaction Status:   Active ; Category:   Drug ; Substance:   Wellbutrin XL ; Type:   Side Effect ; Updated By:   Liane Townsend LPN; Reviewed Date:   6/21/2021 9:57 AM  CDT        Medication List   (As Of: 6/21/2021 10:01:40 AM CDT)   Prescription/Discharge Order    calcitriol  :   calcitriol ; Status:   Prescribed ; Ordered As Mnemonic:   calcitriol 0.5 mcg oral capsule ; Simple Display Line:   2 cap(s), Oral, daily, 180 cap(s), 3 Refill(s) ; Ordering Provider:   Vladimir Mahajan MD; Catalog Code:   calcitriol ; Order Dt/Tm:   2/23/2021 2:59:59 PM CST          levothyroxine  :   levothyroxine ; Status:   Prescribed ; Ordered As Mnemonic:   levothyroxine 150 mcg (0.15 mg) oral tablet ; Simple Display Line:   1 tab(s), Oral, daily, 90 tab(s), 3 Refill(s) ; Ordering Provider:   Vladimir Mahajan MD; Catalog Code:   levothyroxine ; Order Dt/Tm:   2/23/2021 2:59:58 PM CST          olopatadine ophthalmic  :   olopatadine ophthalmic ; Status:   Prescribed ; Ordered As Mnemonic:   Pataday 0.2% ophthalmic solution ; Simple Display Line:   1 drop(s), both eyes, daily, 2.5 mL, 3 Refill(s) ; Ordering Provider:   Vinh Moyer PA-C; Catalog Code:   olopatadine ophthalmic ; Order Dt/Tm:   1/23/2020 8:22:23 AM CST          SUMAtriptan  :   SUMAtriptan ; Status:   Prescribed ; Ordered As Mnemonic:   SUMAtriptan 100 mg oral tablet ; Simple Display Line:   1 tab(s), Oral, daily, AS DIRECTED., PRN: AS NEEDED FOR MIGRAINE HEADACHE, 15 tab(s), 1 Refill(s) ; Ordering Provider:   Vladimir Mahajan MD; Catalog Code:   SUMAtriptan ; Order Dt/Tm:   2/23/2021 3:00:00 PM CST          topiramate  :   topiramate ; Status:   Prescribed ; Ordered As Mnemonic:   topiramate 50 mg oral tablet ; Simple Display Line:   1 tab(s), Oral, daily, 90 tab(s), 3 Refill(s) ; Ordering Provider:   Vladimir Mahajan MD; Catalog Code:   topiramate ; Order Dt/Tm:   2/23/2021 3:00:01 PM CST            Home Meds    calcium carbonate  :   calcium carbonate ; Status:   Documented ; Ordered As Mnemonic:   Tums Ultra 1000 mg oral tablet, chewable ; Simple Display Line:   Up to 5 tabs daily ; Catalog Code:   calcium  carbonate ; Order Dt/Tm:   11/11/2010 1:34:11 PM CST          multivitamin, prenatal  :   multivitamin, prenatal ; Status:   Documented ; Ordered As Mnemonic:   Prenatal Multivitamins ; Simple Display Line:   Oral, daily, 0 Refill(s) ; Catalog Code:   multivitamin, prenatal ; Order Dt/Tm:   1/23/2020 8:07:03 AM CST            Social History   Social History   (As Of: 6/21/2021 10:01:40 AM CDT)   Alcohol:  Current      1 x per month, 3 drinks/episode average.  5 drinks/episode maximum.   (Last Updated: 1/18/2017 10:16:00 AM CST by Liane Townsend LPN)          Tobacco:  Current      4 or less cigarettes(less than 1/4 pack)/day in last 30 days, Cigarettes, 3 per day.   (Last Updated: 6/21/2021 9:59:43 AM CDT by Kisha Tran CMA)          Electronic Cigarette/Vaping:        Electronic Cigarette Use: Never.   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   (Last Updated: 11/28/2011 4:16:00 PM CST by Liane Townsend LPN)          Home/Environment:        Marital status:  (Living together).  Spouse/Partner name: Ravindra.  Lives with Children, Spouse.  2 children.  Living situation: Home/Independent.   (Last Updated: 9/15/2015 5:06:11 PM CDT by Liane Townsend LPN)          Nutrition/Health:        Type of diet: Regular.   (Last Updated: 11/28/2011 4:16:29 PM CST by Liane Townsend LPN)          Exercise:  Does not exercise       Comments:  1/11/2013 1:14 PM - Liane Townsend LPN: No regular exercise.   (Last Updated: 1/11/2013 1:14:12 PM CST by Liane Townsend LPN)          Other:        First menses age 16.  Irregular menses.  Menstrual duration 5 days.  Cycle interval 23 days.  No history of abnormal Pap smear.   (Last Updated: 9/11/2018 1:30:38 PM CDT by Gisela  Charisse

## 2022-02-15 NOTE — TELEPHONE ENCOUNTER
---------------------  From: Keyshawn LIRIANO [place]   To: INGRID CANALES MEETA    Sent: 8/29/2021 6:50:14 PM CDT  Subject: General Message     These are stable, please keep scheduled follow up appointments.    Timothy Mahajan MD      Results:  Date Result Name Ind Value Ref Range   8/26/2021 1:03 PM Sodium Level  139 mmol/L (135 - 146)   8/26/2021 1:03 PM Potassium Level  3.7 mmol/L (3.5 - 5.3)   8/26/2021 1:03 PM Chloride Level  101 mmol/L (98 - 110)   8/26/2021 1:03 PM CO2 Level  29 mmol/L (20 - 32)   8/26/2021 1:03 PM Glucose Level  81 mg/dL (65 - 99)   8/26/2021 1:03 PM BUN  12 mg/dL (7 - 25)   8/26/2021 1:03 PM Creatinine Level ((H)) 1.22 mg/dL (0.50 - 1.10)   8/26/2021 1:03 PM BUN/Creat Ratio  10 (6 - 22)   8/26/2021 1:03 PM eGFR ((L)) 55 mL/min/1.73m2 (> OR = 60 - )   8/26/2021 1:03 PM eGFR African American  64 mL/min/1.73m2 (> OR = 60 - )   8/26/2021 1:03 PM Calcium Level  8.7 mg/dL (8.6 - 10.2)   8/26/2021 1:03 PM Calcium Ionized ((L)) 4.4 mg/dL (4.8 - 5.6)

## 2022-02-15 NOTE — TELEPHONE ENCOUNTER
---------------------  From: Keyshawn LIRIANO, [place]   To: INGRID CANALES    Sent: 2/27/2019 11:49:16 AM CST    Calcium and TSH a little low.  Please make an appointment to discuss these results.    Timothy Mahajan MD    Results:  Date Result Name Ind Value Ref Range   2/26/2019 3:07 PM Sodium Level  140 mmol/L (135 - 146)   2/26/2019 3:07 PM Potassium Level  4.5 mmol/L (3.5 - 5.3)   2/26/2019 3:07 PM Chloride Level  105 mmol/L (98 - 110)   2/26/2019 3:07 PM CO2 Level  27 mmol/L (20 - 32)   2/26/2019 3:07 PM Glucose Level  89 mg/dL (65 - 99)   2/26/2019 3:07 PM BUN  10 mg/dL (7 - 25)   2/26/2019 3:07 PM Creatinine Level  0.85 mg/dL (0.50 - 1.10)   2/26/2019 3:07 PM BUN/Creat Ratio  NOT APPLICABLE (6 - 22)   2/26/2019 3:07 PM eGFR  87 mL/min/1.73m2 (> OR = 60 - )   2/26/2019 3:07 PM eGFR African American  101 mL/min/1.73m2 (> OR = 60 - )   2/26/2019 3:07 PM Calcium Level ((L)) 7.6 mg/dL (8.6 - 10.2)   2/26/2019 3:07 PM T4 Free  1.8 ng/dL (0.8 - 1.8)   2/26/2019 3:07 PM T3 Free  3.3 pg/mL (2.3 - 4.2)   2/26/2019 3:07 PM TSH ((L)) 0.03 mIU/L

## 2022-02-15 NOTE — TELEPHONE ENCOUNTER
---------------------  From: Ana María Lopez CMA (Phone Messages Pool ([id number]_[facility]))   To: Phone Messages Pool ([id number]_[place]);     Sent: 11/16/2020 9:27:55 AM CST  Subject: Phone Message     Phone Message    PCP:   ANN JIMENEZ      Time of Call:  0859       Person Calling:  Pt  Phone number:  [phone]    Returned call at: 0920    Note:   Calls with covid questions wonders if she can return to work? Called back sore throat and headaches and exposed positive covid a week ago. Pt will call back with a fax number for a work excuse note.    Last office visit and reason:  11/09/20 genital lesion NCB

## 2022-02-15 NOTE — TELEPHONE ENCOUNTER
---------------------  From: INGRID CANALES  To: [facility]  Sent: 11/16/2020 02:24 p.m. CST  Subject: RE: Work Leave  [employer] physicians- through occupational health- have instructed me to return to work today since my symptoms aren't worsening and I've tested negative. Please disregard faxing the number in the previous message. I will follow [employer]'s instructions to return to work and follow up with them as needed, and will also make an appointment with Dr Mahajan. Thank you.  ---------------------  From: Kareen Cook RN (Phone Messages Pool ([id number]))  To: INGRID CANALES  Sent: 11/16/2020 1:56:56 PM CST  Subject: RE: Work Leave  ???  Dr. Franco would like to have you do a video visit to discuss symptoms.  ???  Please call and schedule that as soon as you can.  ???  Thanks,  ???  Kareen oCok RN  ???  ???  ---------------------  From: INGRID CANALES  To: [facility]  Sent: 11/16/2020 01:25 p.m. CST  Subject: RE: Work Leave  I think it was the 3rd I started with the headaches.  Sore throat and cough in the last week or so.  I have been tested, with negative results.  Just still don?t feel 100%.  ---------------------  From: Kareen Cook RN (Phone Messages Pool ([id number] Rops))  To: [facility] Message Pool ([id number]_[place]); INGRID CANALES  Sent: 11/16/2020 1:12:46 PM CST  Subject: RE: Work Leave  ???  Angélica Muhammad,  ???  Have you been tested for COVID? When did your symptoms start? Are your symptoms same, improving, or worsening?  ???  Dr. Mahajan may want you to have an appointment to discuss your symptoms and get tested. I will forward your message.  ???  Thank you,  ???  Kareen Cook RN  ???  ???  ---------------------  From: INGRID CANALES  To: [facility]  Sent: 11/16/2020 12:26 p.m. CST  Subject: Work Leave  I need a Dr note faxed to [phone] that includes my name and birthdate and an ok to return to work date that states that I  will be off through at least the 19th (supposed to work the 17th and 18th night shifts) due to possible Covid symptoms after a known exposure.  My symptoms are a severe headache, cough and sore throat, but they do not need to be included in my work letter.  I plan to follow up if my symptoms worsen or continue to not improve. Thanks!

## 2022-02-15 NOTE — NURSING NOTE
Comprehensive Intake Entered On:  11/11/2021 4:43 PM CST    Performed On:  11/11/2021 4:41 PM CST by Dennise Mccray CMA               Summary   Chief Complaint :   Discuss restarting Wellbutrin for smoking cessation. It is on her allergy list as it caused a skin rash but she would like to try it again. Verbal consent obtained for telemed visit.   Menstrual Status :   Hysterectomy   Height Measured :   62.5 in(Converted to: 5 ft 2 in, 158.75 cm)    Height/Length Estimated :   62.5 in(Converted to: 5 ft 2 in, 158.75 cm)    Dennise Mccray CMA - 11/11/2021 4:41 PM CST   Health Status   Allergies Verified? :   Yes   Medication History Verified? :   Yes   Immunizations Current :   Yes   Medical History Verified? :   Yes   Pre-Visit Planning Status :   Completed   Tobacco Use? :   Current every day smoker   Tobacco Cessation Review :   Ready to quit   Dennise Mccray CMA - 11/11/2021 4:41 PM CST   Problems   (As Of: 11/11/2021 4:43:08 PM CST)   Problems(Active)    Acquired hypothyroidism (SNOMED CT  :811318666 )  Name of Problem:   Acquired hypothyroidism ; Recorder:   Connie Munoz CMA; Confirmation:   Confirmed ; Classification:   Medical ; Code:   623853139 ; Contributor System:   PowerChart ; Last Updated:   11/24/2014 3:05 PM CST ; Life Cycle Date:   7/29/2010 ; Life Cycle Status:   Active ; Vocabulary:   SNOMED CT        Hypocalcemia (SNOMED CT  :1265353 )  Name of Problem:   Hypocalcemia ; Recorder:   Harini Modi; Confirmation:   Confirmed ; Classification:   Medical ; Code:   9834927 ; Contributor System:   PowerChart ; Last Updated:   10/26/2015 3:07 PM CDT ; Life Cycle Date:   12/20/2010 ; Life Cycle Status:   Active ; Vocabulary:   SNOMED CT        Hypoparathyroidism (SNOMED CT  :619994324 )  Name of Problem:   Hypoparathyroidism ; Recorder:   Connie Munoz CMA; Confirmation:   Confirmed ; Classification:   Medical ; Code:   559539296 ; Contributor System:   PowerChart ; Last Updated:   4/19/2016 10:03 AM CDT ;  Life Cycle Date:   7/29/2010 ; Life Cycle Status:   Active ; Vocabulary:   SNOMED CT        Migraines (SNOMED CT  :65328045 )  Name of Problem:   Migraines ; Recorder:   Jairo Bales DO; Confirmation:   Confirmed ; Classification:   Medical ; Code:   43446475 ; Contributor System:   Sybari ; Last Updated:   9/11/2018 1:00 PM CDT ; Life Cycle Status:   Active ; Responsible Provider:   Jairo Bales DO; Vocabulary:   SNOMED CT        Mild depression (SNOMED CT  :686689017 )  Name of Problem:   Mild depression ; Recorder:   Dottie Higgins; Confirmation:   Confirmed ; Classification:   Medical ; Code:   378291512 ; Contributor System:   PowerChart ; Last Updated:   10/26/2015 3:06 PM CDT ; Life Cycle Date:   11/1/2010 ; Life Cycle Status:   Active ; Vocabulary:   SNOMED CT        Obesity (SNOMED CT  :6822369343 )  Name of Problem:   Obesity ; Recorder:   SYSTEM; Confirmation:   Probable ; Classification:   Medical ; Code:   1409208233 ; Last Updated:   2/28/2014 7:03 PM CST ; Life Cycle Date:   1/27/2014 ; Life Cycle Status:   Active ; Vocabulary:   SNOMED CT        Tobacco abuse (ICD-9-CM  :305.1 )  Name of Problem:   Tobacco abuse ; Recorder:   Dottie Higgins; Confirmation:   Confirmed ; Classification:   Medical ; Code:   305.1 ; Contributor System:   Sybari ; Last Updated:   2/28/2014 7:03 PM CST ; Life Cycle Date:   11/1/2010 ; Life Cycle Status:   Active ; Vocabulary:   ICD-9-CM          ID Risk Screen   Recent Travel History :   No recent travel   Family Member Travel History :   No recent travel   Other Exposure to Infectious Disease :   Unknown   COVID-19 Testing Status :   No positive COVID-19 test   Andert Dennise ALICEA - 11/11/2021 4:41 PM CST   Social History   Social History   (As Of: 11/11/2021 4:43:08 PM CST)   Alcohol:  Current      1 x per month, 3 drinks/episode average.  5 drinks/episode maximum.   (Last Updated: 1/18/2017 10:16:00 AM CST by Liane Townsend LPN)          Tobacco:  Current       4 or less cigarettes(less than 1/4 pack)/day in last 30 days, Cigarettes, 3 per day.   (Last Updated: 6/21/2021 9:59:43 AM CDT by Kisha Tran CMA)          Electronic Cigarette/Vaping:        Electronic Cigarette Use: Never.   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   (Last Updated: 11/28/2011 4:16:00 PM CST by Liane Townsend LPN)          Home/Environment:        Marital status:  (Living together).  Spouse/Partner name: Ravindra.  Lives with Children, Spouse.  2 children.  Living situation: Home/Independent.   (Last Updated: 9/15/2015 5:06:11 PM CDT by Liane Townsend LPN)          Nutrition/Health:        Type of diet: Regular.   (Last Updated: 11/28/2011 4:16:29 PM CST by Liane Townsend LPN)          Exercise:  Does not exercise       Comments:  1/11/2013 1:14 PM - Liane Townsend LPN: No regular exercise.   (Last Updated: 1/11/2013 1:14:12 PM CST by Liane Townsend LPN)          Other:        First menses age 16.  Irregular menses.  Menstrual duration 5 days.  Cycle interval 23 days.  No history of abnormal Pap smear.   (Last Updated: 9/11/2018 1:30:38 PM CDT by Dottie Higgins)

## 2022-02-15 NOTE — PROGRESS NOTES
Patient:   INGRID CANALES            MRN: [id number]            FIN: [id number]               Age:   39 years     Sex:  Female     :  1980   Associated Diagnoses:   Hypocalcemia; Hypoparathyroidism; Acquired hypothyroidism   Author:   Vladimir Mahajan MD      Visit Information      Date of Service: 2019 02:29 pm  Performing Location: [facility]  Encounter#: [id number]      Primary Care Provider (PCP):  Vladimir Mahajan MD    NPI# [id number]      Referring Provider:  Vladimir Mahajan MD, NPI# [id number]      Chief Complaint   2019 2:41 PM CST    Thyroid med check     Chief complaint and symptoms noted above confirmed with patient.      Interval History   Hypothyroidism   Thyroid function tests Pending.  The course is progressing as expected.        Review of Systems   Constitutional:  Negative.    Ear/Nose/Mouth/Throat:  Negative except as documented in history of present illness.    Respiratory:  Negative.    Cardiovascular:  Negative.       Health Status   Allergies:    Allergic Reactions (Selected)  Severity Not Documented  Rondec (Rash)  Nonallergic Reactions (Selected)  Severity Not Documented  Wellbutrin XL (Rash)   Medications:  (Selected)   Prescriptions  Prescribed  Metoprolol Tartrate 25 mg oral tablet: = 1 tab(s) ( 25 mg ), po, daily, # 90 tab(s), 3 Refill(s), Type: Maintenance, Pharmacy: [id number], 1 tab(s) Oral daily  Pataday 0.2% ophthalmic solution: 1 drop(s), both eyes, daily, # 2.5 mL, 1 Refill(s), Type: Maintenance, Pharmacy: [id number], 1 drop(s) both eyes daily  SUMAtriptan 100 mg oral tablet: = 1 tab(s) ( 100 mg ), po, daily, PRN: as needed for migraine headache, # 9 tab(s), 11 Refill(s), Type: Soft Stop, Pharmacy: [id number], 1 tab(s) Oral daily,PRN:as needed for migraine headache  SUMAtriptan 6 mg/0.5 mL subcutaneous solution: = 0.5 mL ( 6 mg ), Subcutaneous, Once, PRN: for migraine headache, # 2 EA, 6  Refill(s), Type: Soft Stop, Pharmacy: [id number], 0.5 mL Subcutaneous once,PRN:for migraine headache  calcitriol 0.5 mcg oral capsule: = 2 cap(s), Oral, daily, # 180 cap(s), 3 Refill(s), Type: Maintenance, Pharmacy: [id number], 2 cap(s) Oral daily  cyclobenzaprine 10 mg oral tablet: See Instructions, Instructions: TAKE 1 TABLET BY MOUTH THREE TIMES DAILY, # 30 tab(s), Type: Soft Stop, Pharmacy: [facility] #[id number], TAKE 1 TABLET BY MOUTH THREE TIMES DAILY  levothyroxine 150 mcg (0.15 mg) oral capsule: = 1 cap(s) ( 150 mcg ), po, daily, Instructions: Needs appt for further refills, # 90 cap(s), 3 Refill(s), Type: Maintenance, Pharmacy: [id number], 1 cap(s) Oral daily,Instr:Needs appt for further refills  Documented Medications  Documented  Tums Ultra 1000 mg oral tablet, chewable: Instructions: Up to 5 tabs daily, 0 Refill(s)   Problem list:    All Problems (Selected)  Acquired hypothyroidism / SNOMED CT 028258376 / Confirmed  Hypocalcemia / SNOMED CT 0701253 / Confirmed  Migraines / SNOMED CT 08570172 / Confirmed  Mild depression / SNOMED CT 776778663 / Confirmed  Obesity / SNOMED CT 7403128533 / Probable  Hypoparathyroidism / SNOMED CT 067456126 / Confirmed  Tobacco abuse / ICD-9-.1 / Confirmed      Histories   Past Medical History:    Active  Acquired hypothyroidism (SNOMED CT 139336082)  Hypoparathyroidism (SNOMED CT 890457171)  Mild depression (SNOMED CT 002896519)  Tobacco abuse (ICD-9-.1)  Hypocalcemia (SNOMED CT 1088744)  Migraines (SNOMED CT 12960961)  Resolved  *[facility]ospital[facility] - Hypocalcemia: Onset on 11/25/2013 at 33 years.  Resolved on 11/26/2013 at 33 years.  Pregnancy (SNOMED CT 530585285):  Resolved in 1999 at 18 years.  Pregnancy (SNOMED CT 743316847):  Resolved in 2007 at 26 years.  AMA (advanced maternal age) multigravida 35+ (SNOMED CT 2182764269):  Resolved.   Family History:    Alive and well  Mother  Father     Procedure history:      delivery (SNOMED CT 9475404986) on 2016 at 36 Years.  HSG - Hysterosalpingogram (SNOMED CT 658872011) performed by Jairo Bales DO on 10/5/2015 at 35 Years.  Appendectomy (SNOMED CT 638251039) on 2010 at 30 Years.  Laparoscopic cholecystectomy (SNOMED CT 46540755) on 2010 at 30 Years.   section (SNOMED CT 52151895) on 2007 at 26 Years.  Thyroidectomy (SNOMED CT 19956135) performed by Eben Landry MD on 3/18/2003 at 23 Years.  Ts and As - Tonsillectomy and adenoidectomy (SNOMED CT 9993758987) in  at 13 Years.  Myringotomy and insertion of T tube (SNOMED CT 890631771).      Physical Examination   Vital Signs   2019 2:41 PM CST Peripheral Pulse Rate 72 bpm    Pulse Site Radial artery    HR Method Manual    Systolic Blood Pressure 118 mmHg    Diastolic Blood Pressure 66 mmHg    Mean Arterial Pressure 83 mmHg    BP Site Left arm    BP Method Manual      Measurements from flowsheet : Measurements   2019 2:41 PM CST Height Measured - Standard 63 in    Weight Measured - Standard 145.6 lb    BSA 1.71 m2    Body Mass Index 25.79 kg/m2  HI      General:  Alert and oriented.    HENT:  Normocephalic, Tympanic membranes are clear.    Neck:  Supple, Non-tender, No carotid bruit.    Respiratory:  Lungs are clear to auscultation, Respirations are non-labored, Breath sounds are equal.       Impression and Plan   Diagnosis     Hypocalcemia (JIX81-UE E83.51).     Hypoparathyroidism (TGJ52-DS E89.2).     Acquired hypothyroidism (SBT19-WT E03.8).     Course:  Progressing as expected.    Orders     Will adjust Synthroid dose base on labs..

## 2022-02-15 NOTE — NURSING NOTE
Comprehensive Intake Entered On:  1/23/2020 8:09 AM CST    Performed On:  1/23/2020 8:02 AM CST by Ivis Pacheco CMA               Summary   Chief Complaint :   Thyroid med check, med refills, TSH; FMLA paperwork for migraines   Menstrual Status :   Menarcheal   Weight Measured :   145.4 lb(Converted to: 145 lb 6 oz, 65.95 kg)    Height Measured :   62.5 in(Converted to: 5 ft 2 in, 158.75 cm)    Body Mass Index :   26.17 kg/m2 (HI)    Body Surface Area :   1.7 m2   Systolic Blood Pressure :   96 mmHg   Diastolic Blood Pressure :   60 mmHg   Mean Arterial Pressure :   72 mmHg   Peripheral Pulse Rate :   71 bpm   BP Site :   Left arm   BP Method :   Manual   HR Method :   Electronic   Oxygen Saturation :   94 %   Ivis Pacheco CMA - 1/23/2020 8:02 AM CST   Health Status   Allergies Verified? :   Yes   Medication History Verified? :   Yes   Immunizations Current :   Yes   Medical History Verified? :   Yes   Pre-Visit Planning Status :   Completed   Tobacco Use? :   Current every day smoker   Ivis Pacheco CMA - 1/23/2020 8:02 AM CST   Consents   Consent for Immunization Exchange :   Consent Granted   Consent for Immunizations to Providers :   Consent Granted   Ivis Pacheco CMA - 1/23/2020 8:02 AM CST   Meds / Allergies   (As Of: 1/23/2020 8:09:32 AM CST)   Allergies (Active)   Rondec  Estimated Onset Date:   Unspecified ; Reactions:   rash ; Created By:   Connie Munoz CMA; Reaction Status:   Active ; Category:   Drug ; Substance:   Rondec ; Type:   Allergy ; Updated By:   Connie Munoz CMA; Reviewed Date:   1/23/2020 8:05 AM CST      Wellbutrin XL  Estimated Onset Date:   <not entered> 2011 ; Reactions:   Rash ; Created By:   Liane Townsend LPN; Reaction Status:   Active ; Category:   Drug ; Substance:   Wellbutrin XL ; Type:   Side Effect ; Updated By:   Liane Townsend LPN; Reviewed Date:   1/23/2020 8:05 AM CST        Medication List   (As Of: 1/23/2020 8:09:32 AM CST)   Prescription/Discharge Order    calcitriol   :   calcitriol ; Status:   Prescribed ; Ordered As Mnemonic:   calcitriol 0.5 mcg oral capsule ; Simple Display Line:   2 cap(s), Oral, daily, 180 cap(s), 3 Refill(s) ; Ordering Provider:   Vladimir Mahajan MD; Catalog Code:   calcitriol ; Order Dt/Tm:   2/26/2019 2:52:44 PM CST          levothyroxine  :   levothyroxine ; Status:   Prescribed ; Ordered As Mnemonic:   levothyroxine 150 mcg (0.15 mg) oral tablet ; Simple Display Line:   1 tab(s), Oral, daily, 30 tab(s), 0 Refill(s) ; Ordering Provider:   Vladimir Mahajan MD; Catalog Code:   levothyroxine ; Order Dt/Tm:   1/20/2020 11:21:50 AM CST          olopatadine ophthalmic  :   olopatadine ophthalmic ; Status:   Prescribed ; Ordered As Mnemonic:   Pataday 0.2% ophthalmic solution ; Simple Display Line:   1 drop(s), both eyes, daily, 2.5 mL, 1 Refill(s) ; Ordering Provider:   Mikey LIRIANO [facility]; Catalog Code:   olopatadine ophthalmic ; Order Dt/Tm:   7/6/2017 3:55:40 PM CDT          SUMAtriptan  :   SUMAtriptan ; Status:   Prescribed ; Ordered As Mnemonic:   SUMAtriptan 100 mg oral tablet ; Simple Display Line:   100 mg, 1 tab(s), po, daily, PRN: as needed for migraine headache, 9 tab(s), 11 Refill(s) ; Ordering Provider:   Vladimir Mahajan MD; Catalog Code:   SUMAtriptan ; Order Dt/Tm:   2/26/2019 2:53:28 PM CST          SUMAtriptan  :   SUMAtriptan ; Status:   Prescribed ; Ordered As Mnemonic:   SUMAtriptan 6 mg/0.5 mL subcutaneous solution ; Simple Display Line:   6 mg, 0.5 mL, Subcutaneous, Once, PRN: for migraine headache, 2 EA, 6 Refill(s) ; Ordering Provider:   Vladimir Mahajan MD; Catalog Code:   SUMAtriptan ; Order Dt/Tm:   2/26/2019 2:53:38 PM CST            Home Meds    calcium carbonate  :   calcium carbonate ; Status:   Documented ; Ordered As Mnemonic:   Tums Ultra 1000 mg oral tablet, chewable ; Simple Display Line:   Up to 5 tabs daily ; Catalog Code:   calcium carbonate ; Order Dt/Tm:   11/11/2010 1:34:11 PM CST           multivitamin, prenatal  :   multivitamin, prenatal ; Status:   Documented ; Ordered As Mnemonic:   Prenatal Multivitamins ; Simple Display Line:   Oral, daily, 0 Refill(s) ; Catalog Code:   multivitamin, prenatal ; Order Dt/Tm:   1/23/2020 8:07:03 AM CST

## 2022-02-15 NOTE — TELEPHONE ENCOUNTER
---------------------  From: Sujata Burt MA (eRx Pool ([id number]_[facility]))   To: Vinh Moyer PA-C;     Sent: 6/17/2020 11:53:24 AM CDT  Subject: FW: Medication Management   Due Date/Time: 6/18/2020 10:56:00 AM CDT     PCP:   [facility]T    Medication:   Topiramate  Last Filled:  2/18/20    Quantity:  90  Refills:  0    Date of last office visit and reason:   1/23/20  Date of last labs pertaining to condition:  n/a    Note:  please advise     Return to Clinic order placed?  yes     Resource:   _  Phone:   _        ------------------------------------------  From: [facility] #[id number]  To: Vinh Moyer PA-C  Sent: June 17, 2020 10:56:38 AM CDT  Subject: Medication Management  Due: June 17, 2020 4:19:06 PM CDT     ** On Hold Pending Signature **     Dispensed Drug: topiramate (topiramate 50 mg oral tablet), TAKE 1 TABLET BY MOUTH DAILY  Quantity: 90 tab(s)  Days Supply: 90  Refills: 0  Substitutions Allowed  Notes from Pharmacy:  ---------------------------------------------------------------  From: Vinh Moyer PA-C   To: [facility] #[id number]    Sent: 6/17/2020 12:13:18 PM CDT  Subject: FW: Medication Management     ** Submitted: **  Complete:topiramate (topiramate 50 mg oral tablet)   Signed by Vinh Moyer PA-C  6/17/2020 5:13:00 PM    ** Approved with modifications: **  topiramate  TAKE 1 TABLET BY MOUTH DAILY  Qty:  90 tab(s)        Refills:  0          Substitutions Allowed     Details:  90 tab(s), TAKE 1 TABLET BY MOUTH DAILY, Route to Pharmacy Electronically [facility] #[id number], 6/17/2020, 5/10/2020, 90, Vinh Moyer PA-C      Route To Pharmacy - [facility] #[id number]

## 2022-02-15 NOTE — NURSING NOTE
Comprehensive Intake Entered On:  6/16/2021 5:09 PM CDT    Performed On:  6/16/2021 5:04 PM CDT by Kisha Tran CMA   Chief Complaint :   c/o low calcium, muscle spasms   Menstrual Status :   Hysterectomy   Weight Measured :   140.1 lb(Converted to: 140 lb 2 oz, 63.548 kg)    Height Measured :   62.5 in(Converted to: 5 ft 2 in, 158.75 cm)    Body Mass Index :   25.21 kg/m2 (HI)    Body Surface Area :   1.67 m2   Height/Length Estimated :   62.5 in(Converted to: 5 ft 2 in, 158.75 cm)    Systolic Blood Pressure :   112 mmHg   Diastolic Blood Pressure :   66 mmHg   Mean Arterial Pressure :   81 mmHg   Peripheral Pulse Rate :   80 bpm   BP Site :   Right arm   Pulse Site :   Radial artery   BP Method :   Manual   HR Method :   Electronic   Temperature Tympanic :   98.5 DegF(Converted to: 36.9 DegC)    Oxygen Saturation :   96 %   Kisha Tran CMA - 6/16/2021 5:04 PM CDT   Health Status   Allergies Verified? :   Yes   Medication History Verified? :   Yes   Immunizations Current :   Yes   Medical History Verified? :   Yes   Pre-Visit Planning Status :   Not completed   Tobacco Use? :   Current every day smoker   Kisha Tran CMA - 6/16/2021 5:04 PM CDT   Consents   Consent for Immunization Exchange :   Consent Granted   Consent for Immunizations to Providers :   Consent Granted   Kisha Tran CMA - 6/16/2021 5:04 PM CDT   Meds / Allergies   (As Of: 6/16/2021 5:09:27 PM CDT)   Allergies (Active)   Rondec  Estimated Onset Date:   Unspecified ; Reactions:   rash ; Created By:   Connie Munoz CMA; Reaction Status:   Active ; Category:   Drug ; Substance:   Rondec ; Type:   Allergy ; Updated By:   Connie Munoz CMA; Reviewed Date:   6/16/2021 5:06 PM CDT      Wellbutrin XL  Estimated Onset Date:   <not entered> 2011 ; Reactions:   Rash ; Created By:   Liane Townsend LPN; Reaction Status:   Active ; Category:   Drug ; Substance:   Wellbutrin XL ; Type:   Side Effect ; Updated By:   Cary MONTALVO  Liane; Reviewed Date:   6/16/2021 5:06 PM CDT        Medication List   (As Of: 6/16/2021 5:09:27 PM CDT)   Prescription/Discharge Order    calcitriol  :   calcitriol ; Status:   Prescribed ; Ordered As Mnemonic:   calcitriol 0.5 mcg oral capsule ; Simple Display Line:   2 cap(s), Oral, daily, 180 cap(s), 3 Refill(s) ; Ordering Provider:   Vladimir Mahajan MD; Catalog Code:   calcitriol ; Order Dt/Tm:   2/23/2021 2:59:59 PM CST          levothyroxine  :   levothyroxine ; Status:   Prescribed ; Ordered As Mnemonic:   levothyroxine 150 mcg (0.15 mg) oral tablet ; Simple Display Line:   1 tab(s), Oral, daily, 90 tab(s), 3 Refill(s) ; Ordering Provider:   Vladimir Mahajan MD; Catalog Code:   levothyroxine ; Order Dt/Tm:   2/23/2021 2:59:58 PM CST          metroNIDAZOLE topical  :   metroNIDAZOLE topical ; Status:   Processing ; Ordered As Mnemonic:   metroNIDAZOLE 0.75% vaginal gel with applicator ; Ordering Provider:   Vladimir Mahajan MD; Action Display:   Complete ; Catalog Code:   metroNIDAZOLE topical ; Order Dt/Tm:   6/16/2021 5:06:52 PM CDT          olopatadine ophthalmic  :   olopatadine ophthalmic ; Status:   Prescribed ; Ordered As Mnemonic:   Pataday 0.2% ophthalmic solution ; Simple Display Line:   1 drop(s), both eyes, daily, 2.5 mL, 3 Refill(s) ; Ordering Provider:   Vinh Moyer PA-C; Catalog Code:   olopatadine ophthalmic ; Order Dt/Tm:   1/23/2020 8:22:23 AM CST          SUMAtriptan  :   SUMAtriptan ; Status:   Prescribed ; Ordered As Mnemonic:   SUMAtriptan 100 mg oral tablet ; Simple Display Line:   1 tab(s), Oral, daily, AS DIRECTED., PRN: AS NEEDED FOR MIGRAINE HEADACHE, 15 tab(s), 1 Refill(s) ; Ordering Provider:   Vladimir Mahajan MD; Catalog Code:   SUMAtriptan ; Order Dt/Tm:   2/23/2021 3:00:00 PM CST          topiramate  :   topiramate ; Status:   Prescribed ; Ordered As Mnemonic:   topiramate 50 mg oral tablet ; Simple Display Line:   1 tab(s), Oral, daily, 90 tab(s), 3  Refill(s) ; Ordering Provider:   Vladimir Mahajan MD; Catalog Code:   topiramate ; Order Dt/Tm:   2/23/2021 3:00:01 PM CST            Home Meds    calcium carbonate  :   calcium carbonate ; Status:   Documented ; Ordered As Mnemonic:   Tums Ultra 1000 mg oral tablet, chewable ; Simple Display Line:   Up to 5 tabs daily ; Catalog Code:   calcium carbonate ; Order Dt/Tm:   11/11/2010 1:34:11 PM CST          multivitamin, prenatal  :   multivitamin, prenatal ; Status:   Documented ; Ordered As Mnemonic:   Prenatal Multivitamins ; Simple Display Line:   Oral, daily, 0 Refill(s) ; Catalog Code:   multivitamin, prenatal ; Order Dt/Tm:   1/23/2020 8:07:03 AM CST            ID Risk Screen   Recent Travel History :   No recent travel   Family Member Travel History :   No recent travel   Other Exposure to Infectious Disease :   Unknown   COVID-19 Testing Status :   No positive COVID-19 test   Kisha Tran CMA - 6/16/2021 5:04 PM CDT   Social History   Social History   (As Of: 6/16/2021 5:09:27 PM CDT)   Alcohol:  Current      1 x per month, 3 drinks/episode average.  5 drinks/episode maximum.   (Last Updated: 1/18/2017 10:16:00 AM CST by Liane Townsend LPN)          Tobacco:  Current      Cigarettes, 3 per day.   (Last Updated: 1/18/2017 9:25:07 AM CST by Liane Townsend LPN)   Smoker, current status unknown   (Last Updated: 2/23/2021 2:32:01 PM CST by Mary Early CMA)          Electronic Cigarette/Vaping:        Electronic Cigarette Use: Never.   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   (Last Updated: 11/28/2011 4:16:00 PM CST by Liane Townsend LPN)          Home/Environment:        Marital status:  (Living together).  Spouse/Partner name: Julio Cesar   Lives with Children, Spouse.  2 children.  Living situation: Home/Independent.   (Last Updated: 9/15/2015 5:06:11 PM CDT by Liane Townsend LPN)          Nutrition/Health:        Type of diet: Regular.   (Last Updated: 11/28/2011 4:16:29 PM CST by Liane Townsend LPN)          Exercise:  Does not exercise       Comments:  1/11/2013 1:14 PM - Liane Townsend LPN: No regular exercise.   (Last Updated: 1/11/2013 1:14:12 PM CST by Liane Townsend LPN)          Other:        First menses age 16.  Irregular menses.  Menstrual duration 5 days.  Cycle interval 23 days.  No history of abnormal Pap smear.   (Last Updated: 9/11/2018 1:30:38 PM CDT by Dottie Higgins)

## 2022-02-15 NOTE — TELEPHONE ENCOUNTER
---------------------  From: Ana María Lopez CMA (Phone Messages Pool ([id number]_[facility]))   Sent: 2/23/2021 4:15:16 PM CST  Subject: Phone Message     Phone Message    PCP:   CHT      Time of Call:  1545       Person Calling:  Patient   Phone number:  [phone]    Returned call at: 1600    Note:   Calls to see if [facility] sent the medication from today's visit. Called [facility]  they received the medications. Return call to patient informed medications received. She expressed understanding.    Last office visit and reason:  02/23/21 hypothyroidism, vaginal discharge [facility]

## 2022-02-15 NOTE — TELEPHONE ENCOUNTER
---------------------  From: Rohan Bunn MD   To: SABINA CANALES    Sent: 6/22/2021 7:24:31 AM CDT  Subject: Lab     Dear Sabina    CK (muscle enzyme) slightly elevated which I think is to be expected given your muscle spasms (checked to make sure not too elevated).      Results:  Date Result Name Ind Value Ref Range   6/21/2021 10:25 AM Total CK ((H)) 296 unit/L (29 - 143)     Edwin Bunn M.D.

## 2022-02-15 NOTE — PROGRESS NOTES
Patient:   SABINA CANALES            MRN: [id number]            FIN: [id number]               Age:   40 years     Sex:  Female     :  1980   Associated Diagnoses:   Genital lesion, female   Author:   Lavonne Armenta      Chief Complaint   2020 1:23 PM CST    c/o open sore to pubic area--right side, very painful.  did have Brazilian waxing done last week, shaved over area thought to be ingrown hair.  was tested for C19 and negative.        History of Present Illness   Sabina had a Brazilian wax about 2 weeks ago, she shaved over a pimple area and it has proceeded to get sore and grow and now more blisters. She is worried about STI.  She has been with the same partner for 1 year. She has had hysterectomy 2 years ago, ovaries preserved  denies vaginal dc or sores prior to the wax. Denies hx of HSV or other STI      Health Status   Allergies:    Allergic Reactions (Selected)  Severity Not Documented  Rondec (Rash)  Nonallergic Reactions (Selected)  Severity Not Documented  Wellbutrin XL (Rash)   Medications:  (Selected)   Prescriptions  Prescribed  Pataday 0.2% ophthalmic solution: 1 drop(s), both eyes, daily, # 2.5 mL, 3 Refill(s), Type: Maintenance, Pharmacy: [facility] #[id number], 1 drop(s) Eye-Both daily  SUMAtriptan 100 mg oral tablet: = 1 tab(s) ( 100 mg ), po, daily, PRN: as needed for migraine headache, # 90 tab(s), 3 Refill(s), Type: Soft Stop, Pharmacy: [facility] #[id number], 1 tab(s) Oral daily,PRN:as needed for migraine headache  SUMAtriptan 6 mg/0.5 mL subcutaneous solution: = 0.5 mL ( 6 mg ), Subcutaneous, Once, PRN: for migraine headache, # 45 mL, 3 Refill(s), Type: Soft Stop, Pharmacy: [facility] #[id number], 0.5 mL Subcutaneous once,PRN:for migraine headache  calcitriol 0.5 mcg oral capsule: = 2 cap(s), Oral, daily, # 180 cap(s), 3 Refill(s), Type: Maintenance, Pharmacy: [facility] #[id number], 2 cap(s) Oral daily  levothyroxine 150 mcg (0.15 mg) oral tablet: = 1 tab(s),  Oral, daily, # 90 tab(s), 3 Refill(s), Type: Maintenance, Pharmacy: [facility] #[id number], Due for an appt prior to refills, 1 tab(s) Oral daily  topiramate 50 mg oral tablet: = 1 tab(s), Oral, daily, # 90 tab(s), 1 Refill(s), Type: Maintenance, Pharmacy: [facility] #[id number], 1 tab(s) Oral daily, 62.5, in, 20 8:02:00 CST, Height Measured, 145.4, lb, 20 8:02:00 CST, Weight Measured  Documented Medications  Documented  Prenatal Multivitamins: Oral, daily, 0 Refill(s), Type: Maintenance  Tums Ultra 1000 mg oral tablet, chewable: Instructions: Up to 5 tabs daily, 0 Refill(s)   Problem list:    All Problems  Acquired hypothyroidism / SNOMED CT 903649274 / Confirmed  Hypocalcemia / SNOMED CT 2327457 / Confirmed  Migraines / SNOMED CT 48800737 / Confirmed  Mild depression / SNOMED CT 477753749 / Confirmed  Obesity / SNOMED CT 9046792246 / Probable  Hypoparathyroidism / SNOMED CT 198793648 / Confirmed  Tobacco abuse / ICD-9-.1 / Confirmed  Resolved: *Hospitalized@[facility] - Hypocalcemia  Resolved: AMA (advanced maternal age) multigravida 35+ / SNOMED CT 9822457836  Resolved: Pregnancy / SNOMED CT 767769151  Resolved: Pregnancy / SNOMED CT 229381418  Resolved: Pregnant / SNOMED CT 022754734      Histories   Past Medical History:    Active  Acquired hypothyroidism (515551326)  Hypoparathyroidism (440289672)  Mild depression (694950665)  Tobacco abuse (305.1)  Hypocalcemia (3793897)  Migraines (85907903)  Resolved  *Hospitalized@[facility] - Hypocalcemia: Onset on 2013 at 33 years.  Resolved on 2013 at 33 years.  Pregnancy (689996129):  Resolved in  at 18 years.  Pregnancy (449017197):  Resolved in  at 26 years.  AMA (advanced maternal age) multigravida 35+ (1312614721):  Resolved.   Family History:    Alive and well  Mother  Father     Procedure history:     delivery (SNOMED CT 7831694352) on 2016 at 36 Years.  HSG - Hysterosalpingogram (SNOMED CT 973734915) performed by  Jairo Bales DO on 10/5/2015 at 35 Years.  Appendectomy (SNOMED CT 658558643) on 2010 at 30 Years.  Laparoscopic cholecystectomy (SNOMED CT 48813268) on 2010 at 30 Years.   section (SNOMED CT 84788788) on 2007 at 26 Years.  Thyroidectomy (SNOMED CT 05280594) performed by Eben Landyr MD on 3/18/2003 at 23 Years.  Ts and As - Tonsillectomy and adenoidectomy (SNOMED CT 9024349153) in  at 13 Years.  Myringotomy and insertion of T tube (SNOMED CT 739446561).   Social History:        Alcohol Assessment: Current            1 x per month, 3 drinks/episode average.  5 drinks/episode maximum.      Tobacco Assessment: Current            Cigarettes, 3 per day.      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      Home and Environment Assessment            Marital status:  (Living together).  Spouse/Partner name: Ravindra.  Lives with Children, Spouse.  2               children.  Living situation: Home/Independent.      Nutrition and Health Assessment            Type of diet: Regular.      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      Other Assessment            First menses age 16.  Irregular menses.  Menstrual duration 5 days.  Cycle interval 23 days.  No history of               abnormal Pap smear.        Physical Examination   Vital Signs   2020 1:23 PM CST Temperature Tympanic 98 DegF    Peripheral Pulse Rate 76 bpm    Pulse Site Radial artery    HR Method Manual    Systolic Blood Pressure 120 mmHg    Diastolic Blood Pressure 64 mmHg    Mean Arterial Pressure 83 mmHg    BP Site Right arm    BP Method Manual    Oxygen Saturation 98 %       Measurements from flowsheet : Measurements   11/9/2020 1:23 PM CST Height Measured - Standard 62.5 in    Weight Measured - Standard 134.8 lb    BSA 1.64 m2    Body Mass Index 24.26 kg/m2      General:  Alert and oriented.    Genitourinary:  No inguinal tenderness, No urethral discharge, No lesions, few shoddy right inguinal lymph nodes, no tenderness  Has an open sore on right labia majora [id number], has 3 other  1 mm open lesions around this sore and 2 other lesions similar to the small ulcerations up the right clitoral cruz   spec exam reveals vault with everton rugae, no sores, no cervix. Swab of some white discharge obtained for other swabs.    Integumentary:  Warm, Dry, Pink.       Impression and Plan   Diagnosis     Genital lesion, female ([id number] N94.9).     Patient Instructions:       Counseled: Patient, Regarding diagnosis, Regarding treatment, Regarding medications, Verbalized understanding, will test for additional STI's  Possible she was expose to HSV then the trauma of the wax trigger outbreak.  counseled on pain control and starting Valtrex  lesions were very moist so quality of the HSV culture is good.    Orders     Orders (Selected)   Outpatient Orders  Ordered  Wet Prep Vaginal (Request): Priority: Urgent, Vaginal irritation  Ordered (Dispatched)  Chlamydia/Neisseria gonorrhoeae RNA, TMA* ([facility]): Specimen Type: Swab, Collection Date: 11/09/20 13:53:00 CST  Culture, Herpes Simplex Virus with Typing* ([facility]): Specimen Type: Lesion, Collection Date: 11/09/20 13:53:00 CST.

## 2022-02-15 NOTE — NURSING NOTE
Comprehensive Intake Entered On:  11/9/2020 1:31 PM CST    Performed On:  11/9/2020 1:23 PM CST by Pasha OCHOA, Rafaela               Summary   Chief Complaint :   c/o open sore to pubic area--right side, very painful.  did have Brazilian waxing done last week, shaved over area thought to be ingrown hair.  was tested for C19 and negative.   Menstrual Status :   Menarcheal   Weight Measured :   134.8 lb(Converted to: 134 lb 13 oz, 61.144 kg)    Height Measured :   62.5 in(Converted to: 5 ft 2 in, 158.75 cm)    Body Mass Index :   24.26 kg/m2   Body Surface Area :   1.64 m2   Systolic Blood Pressure :   120 mmHg   Diastolic Blood Pressure :   64 mmHg   Mean Arterial Pressure :   83 mmHg   Peripheral Pulse Rate :   76 bpm   BP Site :   Right arm   Pulse Site :   Radial artery   BP Method :   Manual   HR Method :   Manual   Temperature Tympanic :   98 DegF(Converted to: 36.7 DegC)    Oxygen Saturation :   98 %   Pasha OCHOA, Rafaela - 11/9/2020 1:23 PM CST   Health Status   Allergies Verified? :   Yes   Medication History Verified? :   Yes   Immunizations Current :   Yes   Medical History Verified? :   Yes   Pre-Visit Planning Status :   Completed   Tobacco Use? :   Light tobacco smoker   Pasha OCHOA, Rafaela - 11/9/2020 1:23 PM CST   Consents   Consent for Immunization Exchange :   Consent Granted   Consent for Immunizations to Providers :   Consent Granted   Rafaela Pak MA - 11/9/2020 1:23 PM CST   Meds / Allergies   (As Of: 11/9/2020 1:31:05 PM CST)   Allergies (Active)   Rondec  Estimated Onset Date:   Unspecified ; Reactions:   rash ; Created By:   Connie Pardo; Reaction Status:   Active ; Category:   Drug ; Substance:   Rondec ; Type:   Allergy ; Updated By:   Connie Pardo; Reviewed Date:   9/22/2020 4:38 PM CDT      Wellbutrin XL  Estimated Onset Date:   <not entered> 2011 ; Reactions:   Rash ; Created By:   Liane Townsend LPN; Reaction Status:   Active ; Category:   Drug ; Substance:   Wellbutrin XL ; Type:    Side Effect ; Updated By:   Liane Townsend LPN; Reviewed Date:   9/22/2020 4:38 PM CDT        Medication List   (As Of: 11/9/2020 1:31:05 PM [facility])   Prescription/Discharge Order    calcitriol  :   calcitriol ; Status:   Prescribed ; Ordered As Mnemonic:   calcitriol 0.5 mcg oral capsule ; Simple Display Line:   2 cap(s), Oral, daily, 180 cap(s), 3 Refill(s) ; Ordering Provider:   Vinh Moyer PA-C; Catalog Code:   calcitriol ; Order Dt/Tm:   1/23/2020 8:22:25 AM CST          levothyroxine  :   levothyroxine ; Status:   Prescribed ; Ordered As Mnemonic:   levothyroxine 150 mcg (0.15 mg) oral tablet ; Simple Display Line:   1 tab(s), Oral, daily, 90 tab(s), 3 Refill(s) ; Ordering Provider:   Vinh Moyer PA-C; Catalog Code:   levothyroxine ; Order Dt/Tm:   1/23/2020 8:22:24 AM CST          olopatadine ophthalmic  :   olopatadine ophthalmic ; Status:   Prescribed ; Ordered As Mnemonic:   Pataday 0.2% ophthalmic solution ; Simple Display Line:   1 drop(s), both eyes, daily, 2.5 mL, 3 Refill(s) ; Ordering Provider:   Vinh Moyer PA-C; Catalog Code:   olopatadine ophthalmic ; Order Dt/Tm:   1/23/2020 8:22:23 AM CST          SUMAtriptan  :   SUMAtriptan ; Status:   Prescribed ; Ordered As Mnemonic:   SUMAtriptan 100 mg oral tablet ; Simple Display Line:   100 mg, 1 tab(s), po, daily, PRN: as needed for migraine headache, 90 tab(s), 3 Refill(s) ; Ordering Provider:   Vinh Moyer PA-C; Catalog Code:   SUMAtriptan ; Order Dt/Tm:   1/23/2020 8:22:25 AM CST          SUMAtriptan  :   SUMAtriptan ; Status:   Prescribed ; Ordered As Mnemonic:   SUMAtriptan 6 mg/0.5 mL subcutaneous solution ; Simple Display Line:   6 mg, 0.5 mL, Subcutaneous, Once, PRN: for migraine headache, 45 mL, 3 Refill(s) ; Ordering Provider:   Vinh Moyer PA-C; Catalog Code:   SUMAtriptan ; Order Dt/Tm:   1/23/2020 8:22:26 AM CST          topiramate  :   topiramate ; Status:   Prescribed ; Ordered As Mnemonic:   topiramate 50 mg oral tablet ;  Simple Display Line:   1 tab(s), Oral, daily, 90 tab(s), 1 Refill(s) ; Ordering Provider:   Vinh Moyer PA-C; Catalog Code:   topiramate ; Order Dt/Tm:   9/3/2020 12:31:06 PM CDT            Home Meds    calcium carbonate  :   calcium carbonate ; Status:   Documented ; Ordered As Mnemonic:   Tums Ultra 1000 mg oral tablet, chewable ; Simple Display Line:   Up to 5 tabs daily ; Catalog Code:   calcium carbonate ; Order Dt/Tm:   11/11/2010 1:34:11 PM CST          multivitamin, prenatal  :   multivitamin, prenatal ; Status:   Documented ; Ordered As Mnemonic:   Prenatal Multivitamins ; Simple Display Line:   Oral, daily, 0 Refill(s) ; Catalog Code:   multivitamin, prenatal ; Order Dt/Tm:   1/23/2020 8:07:03 AM CST            ID Risk Screen   Recent Travel History :   No recent travel   Family Member Travel History :   No recent travel   Other Exposure to Infectious Disease :   Community exposure to COVID-19 within the last 14 days   Rafaela Pak MA - 11/9/2020 1:23 PM CST   Social History   Social History   (As Of: 11/9/2020 1:31:05 PM CST)   Alcohol:  Current      1 x per month, 3 drinks/episode average.  5 drinks/episode maximum.   (Last Updated: 1/18/2017 10:16:00 AM CST by Liane Townsend LPN)          Tobacco:  Current      Cigarettes, 3 per day.   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   (Last Updated: 11/28/2011 4:16:00 PM CST by Liane Townsend LPN)          Home/Environment:        Marital status:  (Living together).  Spouse/Partner name: Ravindra.  Lives with Children, Spouse.  2 children.  Living situation: Home/Independent.   (Last Updated: 9/15/2015 5:06:11 PM CDT by Liane Townsend LPN)          Nutrition/Health:        Type of diet:  Regular.   (Last Updated: 11/28/2011 4:16:29 PM CST by Liane Townsend LPN)          Exercise:  Does not exercise       Comments:  1/11/2013 1:14 PM - Liane Townsend LPN: No regular exercise.   (Last Updated: 1/11/2013 1:14:12 PM CST by Liane Townsend LPN)          Other:        First menses age 16.  Irregular menses.  Menstrual duration 5 days.  Cycle interval 23 days.  No history of abnormal Pap smear.   (Last Updated: 9/11/2018 1:30:38 PM CDT by Dottie Higgins)

## 2022-02-15 NOTE — NURSING NOTE
Comprehensive Intake Entered On:  2/23/2021 2:42 PM CST    Performed On:  2/23/2021 2:31 PM CST by Mary Early CMA               Summary   Chief Complaint :   Vaginal pain w/ slight discharge x4 days.    Menstrual Status :   Hysterectomy   Weight Measured :   138 lb(Converted to: 138 lb 0 oz, 62.596 kg)    Height/Length Estimated :   62.5 in(Converted to: 5 ft 2 in, 158.75 cm)    Systolic Blood Pressure :   114 mmHg   Diastolic Blood Pressure :   72 mmHg   Mean Arterial Pressure :   86 mmHg   Peripheral Pulse Rate :   78 bpm   BP Site :   Right arm   BP Method :   Manual   Temperature Tympanic :   98.3 DegF(Converted to: 36.8 DegC)    Oxygen Saturation :   99 %   Mary Early CMA - 2/23/2021 2:31 PM CST   Health Status   Allergies Verified? :   Yes   Medication History Verified? :   Yes   Immunizations Current :   Yes   Medical History Verified? :   Yes   Pre-Visit Planning Status :   Completed   Tobacco Use? :   Current every day smoker   Mary Early CMA - 2/23/2021 2:31 PM CST   Consents   Consent for Immunization Exchange :   Consent Granted   Consent for Immunizations to Providers :   Consent Granted   Mary Early CMA - 2/23/2021 2:31 PM CST   Meds / Allergies   (As Of: 2/23/2021 2:42:44 PM CST)   Allergies (Active)   Rondec  Estimated Onset Date:   Unspecified ; Reactions:   rash ; Created By:   Connie Pardo; Reaction Status:   Active ; Category:   Drug ; Substance:   Rondec ; Type:   Allergy ; Updated By:   Connie Pardo; Reviewed Date:   2/23/2021 2:39 PM CST      Wellbutrin XL  Estimated Onset Date:   <not entered> 2011 ; Reactions:   Rash ; Created By:   Liane Townsend LPN; Reaction Status:   Active ; Category:   Drug ; Substance:   Wellbutrin XL ; Type:   Side Effect ; Updated By:   Liane Townsend LPN; Reviewed Date:   2/23/2021 2:39 PM CST        Medication List   (As Of: 2/23/2021 2:42:44 PM CST)   Prescription/Discharge Order    calcitriol  :   calcitriol ; Status:   Prescribed ;  Ordered As Mnemonic:   calcitriol 0.5 mcg oral capsule ; Simple Display Line:   2 cap(s), Oral, daily, 180 cap(s), 0 Refill(s) ; Ordering Provider:   Vinh Moyer PA-C; Catalog Code:   calcitriol ; Order Dt/Tm:   2/11/2021 8:29:33 AM CST          levothyroxine  :   levothyroxine ; Status:   Prescribed ; Ordered As Mnemonic:   levothyroxine 150 mcg (0.15 mg) oral tablet ; Simple Display Line:   1 tab(s), Oral, daily, 90 tab(s), 0 Refill(s) ; Ordering Provider:   Vinh Moyer PA-C; Catalog Code:   levothyroxine ; Order Dt/Tm:   2/11/2021 8:29:33 AM CST          olopatadine ophthalmic  :   olopatadine ophthalmic ; Status:   Prescribed ; Ordered As Mnemonic:   Pataday 0.2% ophthalmic solution ; Simple Display Line:   1 drop(s), both eyes, daily, 2.5 mL, 3 Refill(s) ; Ordering Provider:   Vinh Moyer PA-C; Catalog Code:   olopatadine ophthalmic ; Order Dt/Tm:   1/23/2020 8:22:23 AM CST          SUMAtriptan  :   SUMAtriptan ; Status:   Prescribed ; Ordered As Mnemonic:   SUMAtriptan 100 mg oral tablet ; Simple Display Line:   1 tab(s), Oral, daily, AS DIRECTED., PRN: AS NEEDED FOR MIGRAINE HEADACHE, 90 tab(s), 0 Refill(s) ; Ordering Provider:   Vinh Moyer PA-C; Catalog Code:   SUMAtriptan ; Order Dt/Tm:   2/11/2021 8:29:34 AM CST          topiramate  :   topiramate ; Status:   Prescribed ; Ordered As Mnemonic:   topiramate 50 mg oral tablet ; Simple Display Line:   1 tab(s), Oral, daily, 90 tab(s), 1 Refill(s) ; Ordering Provider:   Vinh Moyer PA-C; Catalog Code:   topiramate ; Order Dt/Tm:   9/3/2020 12:31:06 PM CDT            Home Meds    calcium carbonate  :   calcium carbonate ; Status:   Documented ; Ordered As Mnemonic:   Tums Ultra 1000 mg oral tablet, chewable ; Simple Display Line:   Up to 5 tabs daily ; Catalog Code:   calcium carbonate ; Order Dt/Tm:   11/11/2010 1:34:11 PM CST          multivitamin, prenatal  :   multivitamin, prenatal ; Status:   Documented ; Ordered As Mnemonic:   Prenatal  Multivitamins ; Simple Display Line:   Oral, daily, 0 Refill(s) ; Catalog Code:   multivitamin, prenatal ; Order Dt/Tm:   1/23/2020 8:07:03 AM CST            ID Risk Screen   Recent Travel History :   No recent travel   Family Member Travel History :   No recent travel   Other Exposure to Infectious Disease :   Unknown   COVID-19 Testing Status :   No positive COVID-19 test   Mary Early CMA - 2/23/2021 2:31 PM CST   Social History   Social History   (As Of: 2/23/2021 2:42:44 PM CST)   Alcohol:  Current      1 x per month, 3 drinks/episode average.  5 drinks/episode maximum.   (Last Updated: 1/18/2017 10:16:00 AM CST by Liane Townsend LPN)          Tobacco:  Current      Cigarettes, 3 per day.   (Last Updated: 1/18/2017 9:25:07 AM CST by Liane Townsend LPN)   Smoker, current status unknown   (Last Updated: 2/23/2021 2:32:01 PM CST by Mary Early CMA)          Electronic Cigarette/Vaping:        Electronic Cigarette Use: Never.   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   (Last Updated: 11/28/2011 4:16:00 PM CST by Liane Townsend LPN)          Home/Environment:        Marital status:  (Living together).  Spouse/Partner name: Ravindra.  Lives with Children, Spouse.  2 children.  Living situation: Home/Independent.   (Last Updated: 9/15/2015 5:06:11 PM CDT by Liane Townsend LPN)          Nutrition/Health:        Type of diet: Regular.   (Last Updated: 11/28/2011 4:16:29 PM CST by Liane Townsend LPN)          Exercise:  Does not exercise       Comments:  1/11/2013 1:14 PM - Liane Townsend LPN: No regular exercise.   (Last Updated: 1/11/2013 1:14:12 PM CST by Liane Townsend LPN)          Other:        First menses age 16.  Irregular menses.   Menstrual duration 5 days.  Cycle interval 23 days.  No history of abnormal Pap smear.   (Last Updated: 9/11/2018 1:30:38 PM CDT by Dottie Higgins)

## 2022-02-15 NOTE — PROGRESS NOTES
Patient:   INGRID CANALES            MRN: [id number]            FIN: [id number]               Age:   38 years     Sex:  Female     :  1980   Associated Diagnoses:   Dysmenorrhea   Author:   Vladimir Mahajan MD      Chief Complaint   2018 2:40 PM CDT    Migraine and severe cramps with period   Chief complaint and symptoms noted above confirmed with patient.      History of Present Illness             The patient presents with dysmenorrhea.  The last menstrual period was described as heavier than usual menstrual flow and passing clots.  The dysmenorrhea is described as aching and cramping.  The severity of the dysmenorrhea is moderate.  The dysmenorrhea is worsening.  The dysmenorrhea has lasted for 6 day(s).  Is having unprotected intercourse.  Stopped trying to get pregnant by timing.  Exacerbating factors consist of movement.  Associated symptoms consist of denies abdominal pain, denies bladder dysfunction and denies intermenstrual bleeding.        Review of Systems   Constitutional:  Negative.    Cardiovascular:  Negative.    Gastrointestinal:  Negative.    Genitourinary:  Negative except as documented in history of present illness.    Gynecologic:  Negative except as documented in history of present illness.       Health Status   Allergies:    Allergies reviewed.   Medications:  (Selected)   Prescriptions  Prescribed  Metoprolol Tartrate 25 mg oral tablet: 1 tab(s) ( 25 mg ), po, daily, # 90 tab(s), 3 Refill(s), Type: Maintenance, Pharmacy: [facility] #[id number], 1 tab(s) po daily  Pataday 0.2% ophthalmic solution: 1 drop(s), both eyes, daily, # 2.5 mL, 1 Refill(s), Type: Maintenance, Pharmacy: [facility] #[id number], 1 drop(s) both eyes daily  SUMAtriptan 100 mg oral tablet: 1 tab(s) ( 100 mg ), po, daily, PRN: as needed for migraine headache, # 9 tab(s), 11 Refill(s), Type: Soft Stop, Pharmacy: [facility] #[id number], 1 tab(s) po daily,PRN:as needed for migraine headache  calcitriol 0.5 mcg oral  capsule: 2 cap(s) ( 1 mcg ), po, daily, # 180 cap(s), 3 Refill(s), Type: Maintenance, Pharmacy: [facility] #[id number], 2 cap(s) po daily  cyclobenzaprine 10 mg oral tablet: 1 tab(s) ( 10 mg ), po, tid, # 30 tab(s), Type: Soft Stop, Pharmacy: [facility] #[id number]  levothyroxine 150 mcg (0.15 mg) oral capsule: 1 cap(s) ( 150 mcg ), po, daily, Instructions: Needs appt for further refills, # 90 cap(s), 3 Refill(s), Type: Maintenance, Pharmacy: [facility] #[id number], 1 cap(s) po daily,Instr:Needs appt for further refills  Documented Medications  Documented  Tums Ultra 1000 mg oral tablet, chewable: Instructions: Up to 5 tabs daily, 0 Refill(s)      Histories   Past Medical History:    Active  Acquired hypothyroidism (SNOMED CT 391716933)  Hypocalcemia (SNOMED CT 9592959)  Mild depression (SNOMED CT 643288684)  Hypoparathyroidism (SNOMED CT 344801231)  Tobacco abuse (ICD-9-.1)  Resolved  *Hospitalized@[facility] - Hypocalcemia: Onset on 2013 at 33 years.  Resolved on 2013 at 33 years.  AMA (advanced maternal age) multigravida 35+ (SNOMED CT 9012857761):  Resolved.  Pregnancy (SNOMED CT 260909673):  Resolved in  at 18 years.  Pregnancy (SNOMED CT 770320159):  Resolved in  at 26 years.      Gynecologic history   Menstrual cycle: date of last menstrual period 2018.   Pregnancy status:  3, para 3.   Pap test: 3 years ago, normal.      Physical Examination   Vital Signs   2018 2:40 PM CDT Temperature Tympanic 98.5 DegF    Peripheral Pulse Rate 60 bpm    Pulse Site Radial artery    HR Method Manual    Systolic Blood Pressure 112 mmHg    Diastolic Blood Pressure 66 mmHg    Mean Arterial Pressure 81 mmHg    BP Site Left arm    BP Method Manual      Measurements from flowsheet : Measurements   2018 2:40 PM CDT Height Measured 63 in    Weight Measured 146.6 lb    BSA 1.72 m2    Body Mass Index 25.97 kg/m2  HI      General:  Alert and oriented, No acute distress.    Eye:  Normal  conjunctiva.    HENT:  Normocephalic.    Neck:  Supple.    Respiratory:  Lungs are clear to auscultation.    Cardiovascular:  Normal rate, Regular rhythm.    Gastrointestinal:  Soft, Non-tender, Non-distended.    Genitourinary:  No costovertebral angle tenderness, No inguinal tenderness.         Vagina: Within normal limits.         Uterus: Not tender.         Ovaries: Within normal limits.       Impression and Plan   Diagnosis     Dysmenorrhea (GEG94-GM N94.6).     Course:  Worsening.    Patient Instructions:       Counseled: Patient, Regarding diagnosis, Regarding treatment, Verbalized understanding.    Orders     Orders   Lab (Gen Lab  Reference Lab):  Neisseria gonorrhoeae RNA, TMA* ([facility]) (Order): Specimen Type: Swab, Collection Date: 7/16/2018 3:32 PM CDT  Chlamydia trachomatis RNA, TMA* ([facility]) (Order): Specimen Type: Swab, Collection Date: 7/16/2018 3:32 PM CDT.     Orders   Lab (Gen Lab  Reference Lab):  Sed rate by modified westergren* ([facility]) (Order): Specimen Type: Blood, Collection Date: 7/16/2018 3:34 PM CDT.     Orders   Requests (Consults / Referrals):  Referral (Request) (Order): 7/16/2018 3:35 PM CDT, Referred to: Obstetrics & Gynecology, Referred to: [facility], Reason for referral: Dysmenorrhea, Dysmenorrhea.        Professional Services   Counseling Summary:  This was a 25 minute visit with greater than 50% of that time spent counseling the patient, and coordination of care..    Counseling included differential diagnoses, treatment options, and current condition.    The patient was interactive, attentive, asked questions, and verbalized understanding.

## 2022-02-15 NOTE — PROGRESS NOTES
Patient:   INGRID CANALES            MRN: [id number]            FIN: [id number]               Age:   41 years     Sex:  Female     :  1980   Associated Diagnoses:   Mild depression; Tobacco abuse   Author:   Vladimir Mahajan MD      Visit Information      Date of Service: 2021 06:28 am  Performing Location: [facility]  Encounter#: [id number]      Primary Care Provider (PCP):  Vladimir Mahajan MD    NPI# [id number]      Referring Provider:  Vladimir Mahajan MD    NPI# [id number]   Visit type:  Telephone Encounter.    Source of history:  Patient.    Location of patient:  Home  Location of Provider: Work  Call Start Time:   1645  Call End Time:    1700    Consent for virtual telephone visit obtained.      Chief Complaint   2021 4:41 PM CST   Discuss restarting Wellbutrin for smoking cessation. It is on her allergy list as it caused a skin rash but she would like to try it again. Verbal consent obtained for telemed visit.     Noted.  Unclear if really allergic.      History of Present Illness   Today's visit was conducted via telephone due to the COVID-19 pandemic. Patient's consent to telephone visit was obtained and documented.      Reason for visit:  As above      Review of Systems   Constitutional:  Negative.    Respiratory:  Negative.    Cardiovascular:  Negative.       Impression and Plan   Diagnosis     Mild depression (XQP06-MS F32.9).     Tobacco abuse (QDN13-RC Z72.0).     Course:  Progressing as expected.    Orders     Orders (Selected)   Prescriptions  Prescribed  buPROPion 150 mg/12 hours (SR) oral tablet, extended release: = 1 tab(s) ( 150 mg ), Oral, bid, Instructions: Start 1 tablet daily for 4 days, # 180 tab(s), 0 Refill(s), Type: Maintenance, Pharmacy: [facility] #[id number], 1 tab(s) Oral bid,Instr:Start 1 tablet daily for 4 days, 62.5, in, 21 16:41:00....        Health Status   Allergies:    Allergic Reactions (Selected)  Severity Not  Documented  Rondec (Rash)  Nonallergic Reactions (Selected)  Severity Not Documented  Wellbutrin XL (Rash)   Medications:  (Selected)   Prescriptions  Prescribed  SUMAtriptan 100 mg oral tablet: = 1 tab(s), Oral, daily, Instructions: AS DIRECTED., PRN: AS NEEDED FOR MIGRAINE HEADACHE, # 15 tab(s), 0 Refill(s), Type: Soft Stop, Pharmacy: [facility] #[id number], TAKE 1 TABLET BY MOUTH DAILY AS NEEDED FOR MIGRAINE HEADACHE AS DIRECTED, 62.5...  calcitriol 0.5 mcg oral capsule: = 2 cap(s), Oral, bid, # 360 cap(s), 3 Refill(s), Type: Maintenance, Pharmacy: [facility] #[id number], 2 cap(s) Oral bid, 62.5, in, 06/29/21 11:22:00 CDT, Height Measured, 141.2, lb, 06/29/21 11:22:00 CDT, Weight Measured  levothyroxine 150 mcg (0.15 mg) oral tablet: = 1 tab(s), Oral, daily, # 90 tab(s), 3 Refill(s), Type: Maintenance, Pharmacy: [facility] #[id number], 1 tab(s) Oral daily, 62.5, in, 11/09/20 13:23:00 CST, Height Measured, 138, lb, 02/23/21 14:31:00 CST, Weight Measured  topiramate 50 mg oral tablet: = 1 tab(s), Oral, daily, # 90 tab(s), 0 Refill(s), Type: Maintenance, Pharmacy: [facility] #[id number], TAKE 1 TABLET BY MOUTH DAILY, 62.5, in, 06/29/21 11:22:00 CDT, Height Measured, 141.2, lb, 06/29/21 11:22:00 CDT, Weight Measured  Documented Medications  Documented  Prenatal Multivitamins: Oral, daily, 0 Refill(s), Type: Maintenance  Tums Ultra 1000 mg oral tablet, chewable: Instructions: Up to 5 tabs daily, 0 Refill(s)   Problem list:    All Problems  Acquired hypothyroidism / SNOMED CT 043498217 / Confirmed  Obesity / SNOMED CT 3146826849 / Probable  Hypoparathyroidism / SNOMED CT 359390364 / Confirmed  Tobacco abuse / ICD-9-.1 / Confirmed  Mild depression / SNOMED CT 339281247 / Confirmed  Migraines / SNOMED CT 95988175 / Confirmed  Hypocalcemia / SNOMED CT 2888406 / Confirmed      Histories   Past Medical History:    Active  Acquired hypothyroidism (SNOMED CT 770950775)  Hypoparathyroidism (SNOMED CT  729851466)  Mild depression (SNOMED CT 208978710)  Tobacco abuse (ICD-9-.1)  Hypocalcemia (SNOMED CT 3969373)  Migraines (SNOMED CT 96484095)  Resolved  *Hospitalized@[facility] - Hypocalcemia: Onset on 2013 at 33 years.  Resolved on 2013 at 33 years.  Pregnancy (SNOMED CT 710446822):  Resolved in  at 18 years.  Pregnancy (SNOMED CT 663965280):  Resolved in  at 26 years.  AMA (advanced maternal age) multigravida 35+ (SNOMED CT 7445918159):  Resolved.   Family History:    Alive and well  Mother  Father     Procedure history:     delivery (SNOMED CT 2907394969) on 2016 at 36 Years.  HSG - Hysterosalpingogram (SNOMED CT 671854036) performed by Jairo Bales DO on 10/5/2015 at 35 Years.  Appendectomy (SNOMED CT 434538911) on 2010 at 30 Years.  Laparoscopic cholecystectomy (SNOMED CT 12238370) on 2010 at 30 Years.   section (SNOMED CT 80874098) on 2007 at 26 Years.  Thyroidectomy (SNOMED CT 04257025) performed by Eben Landry MD on 3/18/2003 at 23 Years.  Ts and As - Tonsillectomy and adenoidectomy (SNOMED CT 9026525006) in  at 13 Years.  Myringotomy and insertion of T tube (SNOMED CT 367767850).   Social History:        Electronic Cigarette/Vaping Assessment            Electronic Cigarette Use: Never.      Alcohol Assessment: Current            1 x per month, 3 drinks/episode average.  5 drinks/episode maximum.      Tobacco Assessment: Current            4 or less cigarettes(less than 1/4 pack)/day in last 30 days, Cigarettes, 3 per day.      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      Home and Environment Assessment            Marital status:  (Living together).  Spouse/Partner name: Ravindra.  Lives with Children, Spouse.  2                children.  Living situation: Home/Independent.      Nutrition and Health Assessment            Type of diet: Regular.      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      Other Assessment            First menses age 16.  Irregular menses.  Menstrual duration 5 days.  Cycle interval 23 days.  No history of               abnormal Pap smear.        Physical Examination   Measurements from flowsheet : Measurements   11/11/2021 4:41 PM CST Height Measured - Standard 62.5 in    Height/Length Estimated 62.5 in         Review / Management   Results review:  Lab results   8/26/2021 1:03 PM CDT Sodium Level 139 mmol/L    Potassium Level 3.7 mmol/L    Chloride Level 101 mmol/L    CO2 Level 29 mmol/L    Glucose Level 81 mg/dL    BUN 12 mg/dL    Creatinine 1.22 mg/dL  HI    BUN/Creat Ratio 10    eGFR 55 mL/min/1.73m2  LOW    eGFR African American 64 mL/min/1.73m2    Calcium Level 8.7 mg/dL    Calcium Ionized 4.4 mg/dL  LOW   .

## 2022-02-15 NOTE — PROGRESS NOTES
Patient:   INGRID CANALES            MRN: [id number]            FIN: [id number]               Age:   38 years     Sex:  Female     :  1980   Associated Diagnoses:   Well adult exam; Hypoparathyroidism; Acquired hypothyroidism; Hypocalcemia; Migraines; Actinic keratosis   Author:   Sammy Jensen MD      Visit Information   Visit type:  Annual exam.    Source of history:  Self.    History limitation:  None.       Chief Complaint   3/14/2018 4:03 PM CDT    Annual Exam; also discuss thyroid & blood work        Well Adult History   Well Adult History             The patient presents for well adult exam.  The patient's general health status is described as good.  The patient's diet is described as balanced.  Exercise: routine.  Associated symptoms consist of none.  Medical encounters: none.     Patient notes she has been fatigued. Wondering about thyroid levels. Due for labs.  Also has lesion on forehead. Gets scaly. Occasionally bleeds. Has been present for months. Never heals.      Review of Systems   ROS reviewed as documented in chart      Health Status   Allergies:    Allergic Reactions (Selected)  Severity Not Documented  Rondec (Rash)  Nonallergic Reactions (Selected)  Severity Not Documented  Wellbutrin XL (Rash)   Medications:  (Selected)   Prescriptions  Prescribed  Metoprolol Tartrate 25 mg oral tablet: 1 tab(s) ( 25 mg ), po, daily, # 90 tab(s), 1 Refill(s), Type: Maintenance, Pharmacy: [facility] #[id number]  Pataday 0.2% ophthalmic solution: 1 drop(s), both eyes, daily, # 2.5 mL, 1 Refill(s), Type: Maintenance, Pharmacy: [facility] #[id number], 1 drop(s) both eyes daily  SUMAtriptan 100 mg oral tablet: 1 tab(s) ( 100 mg ), po, daily, PRN: as needed for migraine headache, # 9 tab(s), 5 Refill(s), Type: Soft Stop, Pharmacy: [facility] #[id number]  calcitriol 0.5 mcg oral capsule: 2 cap(s) ( 1 mcg ), po, daily, # 180 EA, 1 Refill(s), Type: Maintenance, Pharmacy: [facility] #[id number]  cyclobenzaprine 10 mg  oral tablet: 1 tab(s) ( 10 mg ), po, tid, # 30 tab(s), Type: Soft Stop, Pharmacy: [facility] #[id number]  levothyroxine 150 mcg (0.15 mg) oral capsule: 1 cap(s) ( 150 mcg ), po, daily, Instructions: Needs appt for further refills, # 30 cap(s), 0 Refill(s), Type: Maintenance, Pharmacy: [facility] #[id number], 1 cap(s) po daily,Instr:Needs appt for further refills  Documented Medications  Documented  Tums Ultra 1000 mg oral tablet, chewable: Instructions: Up to 5 tabs daily, 0 Refill(s)   Problem list:    All Problems  Acquired hypothyroidism / SNOMED CT 218469480 / Confirmed  Hypocalcemia / SNOMED CT 0297871 / Confirmed  Migraines / SNOMED CT 49000981 / Confirmed  Mild depression / SNOMED CT 063020620 / Confirmed  AMA (advanced maternal age) multigravida 35+ / SNOMED CT 9570438733 / Confirmed  Obesity / SNOMED CT 1854064072 / Probable  Hypoparathyroidism / SNOMED CT 713661369 / Confirmed  Tobacco abuse / ICD-9-.1 / Confirmed  Resolved: *Hospitalized@[facility] - Hypocalcemia  Resolved: Pregnancy / SNOMED CT 556411750  Resolved: Pregnancy / SNOMED CT 114856060  Resolved: Pregnant / SNOMED CT 241185754      Histories   Past Medical History:    Active  Acquired hypothyroidism (SNOMED CT 657651051)  Hypoparathyroidism (SNOMED CT 340005606)  Mild depression (SNOMED CT 082838819)  Tobacco abuse (ICD-9-.1)  Hypocalcemia (SNOMED CT 6764953)  Resolved  *Hospitalized@[facility] - Hypocalcemia: Onset on 2013 at 33 years.  Resolved on 2013 at 33 years.  Pregnancy (SNOMED CT 264573071):  Resolved in  at 18 years.  Pregnancy (SNOMED CT 843712439):  Resolved in  at 26 years.   Family History:    Alive and well  Mother  Father     Procedure history:     delivery (3059018738) on 2016 at 36 Years.  HSG - Hysterosalpingogram (564308149) on 10/5/2015 at 35 Years.  Appendectomy (266935789) on 2010 at 30 Years.  Laparoscopic cholecystectomy (23624634) on 2010 at 30 Years.   section  (58696077) on 1/1/2007 at 26 Years.  Thyroidectomy (66943082) on 3/18/2003 at 23 Years.  Ts and As - Tonsillectomy and adenoidectomy (4196811104) in 1993 at 13 Years.  Myringotomy and insertion of T tube (797432291).   Social History:        Alcohol Assessment: Current            1 x per month, 3 drinks/episode average.  5 drinks/episode maximum.      Tobacco Assessment: Current            Cigarettes, 3 per day.      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      Home and Environment Assessment            Marital status:  (Living together).  Spouse/Partner name: Ravindra.  Lives with Children, Spouse.  2               children.  Living situation: Home/Independent.      Nutrition and Health Assessment            Type of diet: Regular.      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      Other Assessment            First menses age 14.        Physical Examination   Vital Signs   3/14/2018 4:03 PM CDT Temperature Tympanic 97.1 DegF  LOW    Peripheral Pulse Rate 84 bpm    Pulse Site Radial artery    HR Method Manual    Systolic Blood Pressure 124 mmHg    Diastolic Blood Pressure 72 mmHg    Mean Arterial Pressure 89 mmHg    BP Site Right arm    BP Method Manual      Measurements from flowsheet : Measurements   3/14/2018 4:03 PM CDT Height Measured - Standard 63 in    Weight Measured - Standard 151 lb    BSA 1.74 m2    Body Mass Index 26.75 kg/m2  HI      General:  Alert and oriented, No acute distress.    Eye:  Pupils are equal, round and reactive to light, Extraocular movements are intact, Normal conjunctiva.    HENT:  Normocephalic, Tympanic membranes are clear, Oral  mucosa is moist, No pharyngeal erythema.    Neck:  Supple, Non-tender, No lymphadenopathy, No thyromegaly.    Respiratory:  Lungs are clear to auscultation.    Cardiovascular:  Normal rate, Regular rhythm.    Breast:  No mass, No tenderness, No discharge.    Gastrointestinal:  Soft, Non-tender, Non-distended, No organomegaly.    Musculoskeletal:  Normal range of motion, Normal strength.    Integumentary:  Warm, Dry, Pink, No rash, 3mm pink scaly raised lesion on center of forehead.    Neurologic:  Alert, Oriented, Normal sensory.    Psychiatric:  Cooperative, Appropriate mood & affect.       Review / Management     Lesion treated with liquid nitrogen in a 3 freeze thaw cycle.  Total 1 lesion treated.       Impression and Plan   Diagnosis     Well adult exam (KJG24-VD Z00.00).     Course:  Progressing as expected.    Patient Instructions:       Counseled: Patient, BMI, diet, and exercise.    Diagnosis     Hypoparathyroidism (DXE31-UN E89.2).     Acquired hypothyroidism (OPI73-JR E03.8).     Hypocalcemia (TDF69-JI E83.51).     Migraines (HTS80-LU G43.909).     Plan:  labs drawn today. Med refills will be sent tomorrow once results are back. .    Diagnosis     Actinic keratosis (SBV61-LH L57.0).     Course:  Worsening.    Plan:  Lesion frozen. Follow up if doesn't resolve for biopsy..

## 2022-02-15 NOTE — TELEPHONE ENCOUNTER
---------------------  From: Lavonne Armenta   To: INGRID CANALES    Sent: 11/9/2020 2:45:35 PM CST  Subject: General Message       Kirill Muhammad,  The test for yeast and Trichomonas is negative. The other tests will return hopefully later this week.    DEBORAH Nova    Results:  Date Result Name Value   11/9/2020 2:05 PM Wet Prep Yeast None Seen   11/9/2020 2:05 PM Wet Prep Trichomonas None Seen   11/9/2020 2:05 PM Wet Prep Clue Cells None Seen

## 2022-02-15 NOTE — TELEPHONE ENCOUNTER
---------------------  From: Rohan Bunn MD   To: INGRID CANALES    Cc: Steven LIRIANO, Jr;      Sent: 6/21/2021 2:57:44 PM CDT  Subject: Labs     Dear Ingrid    Calcium improved 7.7  Follow up with Nephrology next week      Results:  Date Result Name Ind Value Ref Range   6/21/2021 10:25 AM Sodium Level  137 mmol/L (135 - 146)   6/21/2021 10:25 AM Potassium Level  4.0 mmol/L (3.5 - 5.3)   6/21/2021 10:25 AM Chloride Level  105 mmol/L (98 - 110)   6/21/2021 10:25 AM CO2 Level  26 mmol/L (20 - 32)   6/21/2021 10:25 AM Glucose Level  88 mg/dL (65 - 99)   6/21/2021 10:25 AM BUN  20 mg/dL (7 - 25)   6/21/2021 10:25 AM Creatinine Level ((H)) 1.18 mg/dL (0.50 - 1.10)   6/21/2021 10:25 AM BUN/Creat Ratio  17 (6 - 22)   6/21/2021 10:25 AM eGFR ((L)) 58 mL/min/1.73m2 (> OR = 60 - )   6/21/2021 10:25 AM eGFR African American  67 mL/min/1.73m2 (> OR = 60 - )   6/21/2021 10:25 AM Calcium Level ((L)) 7.7 mg/dL (8.6 - 10.2)     Edwin Bunn M.D.

## 2022-02-15 NOTE — NURSING NOTE
Depression Screening Entered On:  1/23/2020 8:27 AM CST    Performed On:  1/23/2020 8:27 AM CST by Ivis Pacheco CMA               Depression Screening   Little Interest - Pleasure in Activities :   Not at all   Feeling Down, Depressed, Hopeless :   Not at all   Initial Depression Screen Score :   0    Trouble Falling or Staying Asleep :   Nearly every day   Feeling Tired or Little Energy :   More than half the days   Poor Appetite or Overeating :   More than half the days   Feeling Bad About Yourself :   Not at all   Trouble Concentrating :   Not at all   Moving or Speaking Slowly :   Not at all   Thoughts Better Off Dead or Hurting Self :   Not at all   Detailed Depression Screen Score :   7    Total Depression Screen Score :   7    GILBERTO Difficulty with Work, Home, Others :   Somewhat difficult   Ivis Pacheco CMA - 1/23/2020 8:27 AM CST

## 2022-02-15 NOTE — PROGRESS NOTES
Patient:   INGRID CANALES            MRN: [id number]            FIN: [id number]               Age:   39 years     Sex:  Female     :  1980   Associated Diagnoses:   Hypocalcemia; Acquired hypothyroidism; Screening cholesterol level; Hypoparathyroidism; Mild depression; Migraines; Tobacco abuse   Author:   Vinh Moyer PA-C      Visit Information      Date of Service: 2020 07:58 am  Performing Location: [facility]  Encounter#: [id number]      Primary Care Provider (PCP):  Vladimir Mahajan MD    NPI# [id number]      Referring Provider:  Vinh Moyer PA-C    NPI# [id number]      Chief Complaint   2020 8:02 AM CST    Thyroid med check, med refills, TSH; FMLA paperwork for migraines     Chief complaint and symptoms noted above confirmed with patient.      Interval History   Hypothyroidism   Side effects of medication unchanged.  Thyroid function tests Pending.  The course is progressing as expected.     More migraines as of late. Needs paperwork filled out for FMLA. Mood OK and stable. Needs refills.      Review of Systems   Constitutional:  Negative.    Ear/Nose/Mouth/Throat:  Negative except as documented in history of present illness.    Respiratory:  Negative.    Cardiovascular:  Negative.    Endocrine:  Negative.    Neurologic:  Negative except as documented in history of present illness.    Psychiatric:  Negative except as documented in history of present illness.       Health Status   Allergies:    Allergic Reactions (Selected)  Severity Not Documented  Rondec (Rash)  Nonallergic Reactions (Selected)  Severity Not Documented  Wellbutrin XL (Rash)   Medications:  (Selected)   Prescriptions  Prescribed  Pataday 0.2% ophthalmic solution: 1 drop(s), both eyes, daily, # 2.5 mL, 3 Refill(s), Type: Maintenance, Pharmacy: [facility] #[id number], 1 drop(s) Eye-Both daily  SUMAtriptan 100 mg oral tablet: = 1 tab(s) ( 100 mg ), po, daily, PRN: as needed for migraine headache, # 90  tab(s), 3 Refill(s), Type: Soft Stop, Pharmacy: [facility] #[id number], 1 tab(s) Oral daily,PRN:as needed for migraine headache  SUMAtriptan 6 mg/0.5 mL subcutaneous solution: = 0.5 mL ( 6 mg ), Subcutaneous, Once, PRN: for migraine headache, # 45 mL, 3 Refill(s), Type: Soft Stop, Pharmacy: [facility] #[id number], 0.5 mL Subcutaneous once,PRN:for migraine headache  Topamax 25 mg oral tablet: = 1 tab(s) ( 25 mg ), Oral, daily, Instructions: at HS to prevent migraine, # 30 tab(s), 0 Refill(s), Type: Maintenance, Pharmacy: [facility] #[id number], 1 tab(s) Oral daily,Instr:at HS to prevent migraine  calcitriol 0.5 mcg oral capsule: = 2 cap(s), Oral, daily, # 180 cap(s), 3 Refill(s), Type: Maintenance, Pharmacy: [facility] #[id number], 2 cap(s) Oral daily  levothyroxine 150 mcg (0.15 mg) oral tablet: = 1 tab(s), Oral, daily, # 90 tab(s), 3 Refill(s), Type: Maintenance, Pharmacy: [facility] #[id number], Due for an appt prior to refills, 1 tab(s) Oral daily  Documented Medications  Documented  Prenatal Multivitamins: Oral, daily, 0 Refill(s), Type: Maintenance  Tums Ultra 1000 mg oral tablet, chewable: Instructions: Up to 5 tabs daily, 0 Refill(s)   Problem list:    All Problems  Tobacco abuse / ICD-9-.1 / Confirmed  Obesity / SNOMED CT 2654619829 / Probable  Mild depression / SNOMED CT 396938920 / Confirmed  Migraines / SNOMED CT 54938039 / Confirmed  Hypoparathyroidism / SNOMED CT 334569106 / Confirmed  Hypocalcemia / SNOMED CT 6184395 / Confirmed  Acquired hypothyroidism / SNOMED CT 739958519 / Confirmed  Resolved: Pregnant / SNOMED CT 549440790  Resolved: Pregnancy / SNOMED CT 136925531  Resolved: Pregnancy / SNOMED CT 592436986  Resolved: AMA (advanced maternal age) multigravida 35+ / SNOMED CT 1578160741  Resolved: *Hospitalized@[facility] - Hypocalcemia      Histories   Past Medical History:    Active  Acquired hypothyroidism (235226792)  Hypoparathyroidism (472177969)  Mild depression  (367472211)  Tobacco abuse (305.1)  Hypocalcemia (6036696)  Migraines (38850713)  Resolved  *Hospitalized@[facility] - Hypocalcemia: Onset on 2013 at 33 years.  Resolved on 2013 at 33 years.  Pregnancy (021266533):  Resolved in  at 18 years.  Pregnancy (346932742):  Resolved in  at 26 years.  AMA (advanced maternal age) multigravida 35+ (4133503027):  Resolved.   Family History:    Alive and well  Mother  Father     Procedure history:     delivery (4616196090) on 2016 at 36 Years.  HSG - Hysterosalpingogram (406277051) on 10/5/2015 at 35 Years.  Appendectomy (935105199) on 2010 at 30 Years.  Laparoscopic cholecystectomy (63567588) on 2010 at 30 Years.   section (66332854) on 2007 at 26 Years.  Thyroidectomy (16792960) on 3/18/2003 at 23 Years.  Ts and As - Tonsillectomy and adenoidectomy (1666787883) in  at 13 Years.  Myringotomy and insertion of T tube (246319706).   Social History:        Alcohol Assessment: Current            1 x per month, 3 drinks/episode average.  5 drinks/episode maximum.      Tobacco Assessment: Current            Cigarettes, 3 per day.      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      Home and Environment Assessment            Marital status:  (Living together).  Spouse/Partner name: Ravindra.  Lives with Children, Spouse.  2               children.  Living situation: Home/Independent.      Nutrition and Health Assessment            Type of diet: Regular.      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      Other Assessment            First menses age 16.  Irregular menses.  Menstrual  duration 5 days.  Cycle interval 23 days.  No history of               abnormal Pap smear.        Physical Examination   Vital Signs   1/23/2020 8:02 AM CST Peripheral Pulse Rate 71 bpm    HR Method Electronic    Systolic Blood Pressure 96 mmHg    Diastolic Blood Pressure 60 mmHg    Mean Arterial Pressure 72 mmHg    BP Site Left arm    BP Method Manual    Oxygen Saturation 94 %      Measurements from flowsheet : Measurements   1/23/2020 8:02 AM CST Height Measured - Standard 62.5 in    Weight Measured - Standard 145.4 lb    BSA 1.7 m2    Body Mass Index 26.17 kg/m2  HI      General:  Alert and oriented.    HENT:  Normocephalic, Oral mucosa is moist, No pharyngeal erythema.    Neck:  Supple, Non-tender, No carotid bruit, thyroid surgically absent.    Respiratory:  Lungs are clear to auscultation, Respirations are non-labored, Breath sounds are equal.    Cardiovascular:  Normal rate, Regular rhythm.    Gastrointestinal:  Soft, Non-tender, Non-distended, No organomegaly.    Genitourinary:  No costovertebral angle tenderness.    Integumentary:  No rash.    Neurologic:  Normal deep tendon reflexes.       Health Maintenance      Recommendations     Pending (in the next year)        OverDue           Cervical Cancer Screen (if sexually active) due  01/11/14  and every 1  year(s)           Alcohol Misuse Screen (Female) due  03/15/19  and every 1  year(s)           Depression Screen (Female) due  03/15/19  and every 1  year(s)        Due            Lipid Disorders Screen (Female) due  01/23/20  and every 1  year(s)        Due In Future            Influenza Vaccine not due until  08/31/20  and every 1  year(s)     Satisfied (in the past 1 year)        Satisfied            Body Mass Index Check (Female) on  01/23/20.           Body Mass Index Check (Female) on  02/26/19.           High Blood Pressure Screen (Female) on  01/23/20.           High Blood Pressure Screen (Female) on  02/26/19.           Influenza Vaccine on   09/30/19.           Obesity Screen and Counseling (Female) on  01/23/20.           Obesity Screen and Counseling (Female) on  02/26/19.          Impression and Plan   Diagnosis     Hypocalcemia (TSJ72-AW E83.51).     Acquired hypothyroidism (IZA35-AI E03.8).     Screening cholesterol level (IJU49-YY Z13.220).     Hypoparathyroidism (QDU54-TG E89.2).     Mild depression (QTQ95-MT F32.9).     Migraines (CLH36-SZ G43.909).     Tobacco abuse (FVI87-AA Z72.0).     Patient Instructions:       Counseled: Patient, Regarding diagnosis, Regarding treatment, Regarding medications, Diet, Activity, Verbalized understanding.    Orders     Orders (Selected)   Outpatient Orders  Ordered  Return to Clinic (Request): RFV: Thyroid med check, TSH, Return in 1 year  Ordered (Dispatched)  CBC (h/h, RBC, indices, WBC, Plt)* ([facility]): Specimen Type: Blood, Collection Date: 01/23/20 8:06:00 CST  Calcium, Ionized* ([facility]): Specimen Type: Serum, Collection Date: 01/23/20 8:06:00 CST  Comprehensive Metabolic Panel* ([facility]): Specimen Type: Serum, Collection Date: 01/23/20 8:06:00 CST  Lipid panel with reflex to direct ldl* ([facility]): Specimen Type: Serum, Collection Date: 01/23/20 8:06:00 CST  PTH, Intact And Calcium* ([facility]): Specimen Type: Serum, Collection Date: 01/23/20 8:06:00 CST  T3, free* ([facility]): Specimen Type: Serum, Collection Date: 01/23/20 8:06:00 CST  T4, free* ([facility]): Specimen Type: Serum, Collection Date: 01/23/20 8:06:00 CST  TSH* ([facility]): Specimen Type: Serum, Collection Date: 01/23/20 8:06:00 CST  Prescriptions  Prescribed  Pataday 0.2% ophthalmic solution: 1 drop(s), both eyes, daily, # 2.5 mL, 3 Refill(s), Type: Maintenance, Pharmacy: [facility] #[id number], 1 drop(s) Eye-Both daily  SUMAtriptan 100 mg oral tablet: = 1 tab(s) ( 100 mg ), po, daily, PRN: as needed for migraine headache, # 90 tab(s), 3 Refill(s), Type: Soft Stop, [id number], 1 tab(s) Oral daily,PRN:as needed for migraine  headache  SUMAtriptan 6 mg/0.5 mL subcutaneous solution: = 0.5 mL ( 6 mg ), Subcutaneous, Once, PRN: for migraine headache, # 45 mL, 3 Refill(s), Type: Soft Stop, Pharmacy: [facility] #[id number], 0.5 mL Subcutaneous once,PRN:for migraine headache  Topamax 25 mg oral tablet: = 1 tab(s) ( 25 mg ), Oral, daily, Instructions: at HS to prevent migraine, # 30 tab(s), 0 Refill(s), Type: Maintenance, Pharmacy: [facility] #[id number], 1 tab(s) Oral daily,Instr:at HS to prevent migraine  calcitriol 0.5 mcg oral capsule: = 2 cap(s), Oral, daily, # 180 cap(s), 3 Refill(s), Type: Maintenance, Pharmacy: [facility] #[id number], 2 cap(s) Oral daily  levothyroxine 150 mcg (0.15 mg) oral tablet: = 1 tab(s), Oral, daily, # 90 tab(s), 3 Refill(s), Type: Maintenance, Pharmacy: [facility] #[id number], Due for an appt prior to refills, 1 tab(s) Oral daily.     Trial low dose Topamax for migraine prevention. Call if effective and will get refills.

## 2022-02-15 NOTE — NURSING NOTE
Comprehensive Intake Entered On:  2/26/2019 2:46 PM CST    Performed On:  2/26/2019 2:41 PM CST by Sujata Burt MA               Summary   Chief Complaint :   Thyroid med check    Menstrual Status :   Menarcheal   Weight Measured :   145.6 lb(Converted to: 145 lb 10 oz, 66.04 kg)    Height Measured :   63 in(Converted to: 5 ft 3 in, 160.02 cm)    Body Mass Index :   25.79 kg/m2 (HI)    Body Surface Area :   1.71 m2   Systolic Blood Pressure :   118 mmHg   Diastolic Blood Pressure :   66 mmHg   Mean Arterial Pressure :   83 mmHg   Peripheral Pulse Rate :   72 bpm   BP Site :   Left arm   Pulse Site :   Radial artery   BP Method :   Manual   HR Method :   Manual   Sujata Burt MA - 2/26/2019 2:41 PM CST   Health Status   Allergies Verified? :   Yes   Medication History Verified? :   Yes   Immunizations Current :   Yes   Medical History Verified? :   Yes   Pre-Visit Planning Status :   Completed   Tobacco Use? :   Current every day smoker   Sujata Burt MA - 2/26/2019 2:41 PM CST   Consents   Consent for Immunization Exchange :   Consent Granted   Consent for Immunizations to Providers :   Consent Granted   Sujata Burt MA - 2/26/2019 2:41 PM CST   Problems   (As Of: 2/26/2019 2:46:40 PM CST)   Problems(Active)    Acquired hypothyroidism (SNOMED CT  :812735734 )  Name of Problem:   Acquired hypothyroidism ; Recorder:   Connie Munoz CMA; Confirmation:   Confirmed ; Classification:   Medical ; Code:   423281356 ; Contributor System:   Joppel ; Last Updated:   11/24/2014 3:05 PM CST ; Life Cycle Date:   7/29/2010 ; Life Cycle Status:   Active ; Vocabulary:   SNOMED CT        Hypocalcemia (SNOMED CT  :6896369 )  Name of Problem:   Hypocalcemia ; Recorder:   Harini Modi; Confirmation:   Confirmed ; Classification:   Medical ; Code:   7844557 ; Contributor System:   PowerChart ; Last Updated:   10/26/2015 3:07 PM CDT ; Life Cycle Date:   12/20/2010 ; Life Cycle Status:   Active ; Vocabulary:   SNOMED CT         Hypoparathyroidism (SNOMED CT  :480807799 )  Name of Problem:   Hypoparathyroidism ; Recorder:   Connie Munoz CMA; Confirmation:   Confirmed ; Classification:   Medical ; Code:   253794854 ; Contributor System:   PowerChart ; Last Updated:   4/19/2016 10:03 AM CDT ; Life Cycle Date:   7/29/2010 ; Life Cycle Status:   Active ; Vocabulary:   SNOMED CT        Migraines (SNOMED CT  :57308455 )  Name of Problem:   Migraines ; Recorder:   Jairo Bales DO; Confirmation:   Confirmed ; Classification:   Medical ; Code:   74626425 ; Contributor System:   PowerChart ; Last Updated:   9/11/2018 1:00 PM CDT ; Life Cycle Status:   Active ; Responsible Provider:   Jairo Bales DO; Vocabulary:   SNOMED CT        Mild depression (SNOMED CT  :576409016 )  Name of Problem:   Mild depression ; Recorder:   Dottie Higgins; Confirmation:   Confirmed ; Classification:   Medical ; Code:   025308617 ; Contributor System:   PowerChart ; Last Updated:   10/26/2015 3:06 PM CDT ; Life Cycle Date:   11/1/2010 ; Life Cycle Status:   Active ; Vocabulary:   SNOMED CT        Obesity (SNOMED CT  :6006028304 )  Name of Problem:   Obesity ; Recorder:   SYSTEM; Confirmation:   Probable ; Classification:   Medical ; Code:   1958510525 ; Last Updated:   2/28/2014 7:03 PM CST ; Life Cycle Date:   1/27/2014 ; Life Cycle Status:   Active ; Vocabulary:   SNOMED CT        Tobacco abuse (ICD-9-CM  :305.1 )  Name of Problem:   Tobacco abuse ; Recorder:   Dottie Higgins; Confirmation:   Confirmed ; Classification:   Medical ; Code:   305.1 ; Contributor System:   PowerChart ; Last Updated:   2/28/2014 7:03 PM CST ; Life Cycle Date:   11/1/2010 ; Life Cycle Status:   Active ; Vocabulary:   ICD-9-CM          Meds / Allergies   (As Of: 2/26/2019 2:46:40 PM CST)   Allergies (Active)   Rondec  Estimated Onset Date:   Unspecified ; Reactions:   rash ; Created By:   Connie Munoz CMA; Reaction Status:   Active ; Category:   Drug ; Substance:   Rondec ; Type:    Allergy ; Updated By:   Connie Munoz CMA; Reviewed Date:   2/26/2019 2:44 PM CST      Wellbutrin XL  Estimated Onset Date:   <not entered> 2011 ; Reactions:   Rash ; Created By:   Liane Townsend LPN; Reaction Status:   Active ; Category:   Drug ; Substance:   Wellbutrin XL ; Type:   Side Effect ; Updated By:   Liane Townsend LPN; Reviewed Date:   2/26/2019 2:44 PM CST        Medication List   (As Of: 2/26/2019 2:46:40 PM CST)   Prescription/Discharge Order    calcitriol  :   calcitriol ; Status:   Prescribed ; Ordered As Mnemonic:   calcitriol 0.5 mcg oral capsule ; Simple Display Line:   2 cap(s), Oral, daily, 60 cap(s), 0 Refill(s) ; Ordering Provider:   Sammy Jensen MD; Catalog Code:   calcitriol ; Order Dt/Tm:   2/21/2019 4:12:03 PM          SUMAtriptan  :   SUMAtriptan ; Status:   Prescribed ; Ordered As Mnemonic:   SUMAtriptan 6 mg/0.5 mL subcutaneous solution ; Simple Display Line:   6 mg, 0.5 mL, Subcutaneous, Once, PRN: for migraine headache, 1 dose(s), 6 Refill(s) ; Ordering Provider:   Vinh Moyer PA-C; Catalog Code:   SUMAtriptan ; Order Dt/Tm:   11/6/2018 11:32:28 AM          cyclobenzaprine 10 mg oral tablet  :   cyclobenzaprine 10 mg oral tablet ; Status:   Prescribed ; Ordered As Mnemonic:   cyclobenzaprine 10 mg oral tablet ; Simple Display Line:   See Instructions, TAKE 1 TABLET BY MOUTH THREE TIMES DAILY, 30 tab(s) ; Ordering Provider:   Sammy Jensen MD; Catalog Code:   cyclobenzaprine ; Order Dt/Tm:   11/2/2018 3:15:05 PM          levothyroxine  :   levothyroxine ; Status:   Prescribed ; Ordered As Mnemonic:   levothyroxine 150 mcg (0.15 mg) oral capsule ; Simple Display Line:   150 mcg, 1 cap(s), po, daily, Needs appt for further refills, 90 cap(s), 3 Refill(s) ; Ordering Provider:   Sammy Jensen MD; Catalog Code:   levothyroxine ; Order Dt/Tm:   3/15/2018 10:05:43 AM          SUMAtriptan  :   SUMAtriptan ; Status:   Prescribed ; Ordered As Mnemonic:   SUMAtriptan 100 mg oral  tablet ; Simple Display Line:   100 mg, 1 tab(s), po, daily, PRN: as needed for migraine headache, 9 tab(s), 11 Refill(s) ; Ordering Provider:   Sammy Jensen MD; Catalog Code:   SUMAtriptan ; Order Dt/Tm:   3/15/2018 10:05:41 AM          metoprolol  :   metoprolol ; Status:   Prescribed ; Ordered As Mnemonic:   Metoprolol Tartrate 25 mg oral tablet ; Simple Display Line:   25 mg, 1 tab(s), po, daily, 90 tab(s), 3 Refill(s) ; Ordering Provider:   Sammy Jensen MD; Catalog Code:   metoprolol ; Order Dt/Tm:   3/15/2018 10:05:38 AM          olopatadine ophthalmic  :   olopatadine ophthalmic ; Status:   Prescribed ; Ordered As Mnemonic:   Pataday 0.2% ophthalmic solution ; Simple Display Line:   1 drop(s), both eyes, daily, 2.5 mL, 1 Refill(s) ; Ordering Provider:   Sammy Jensen MD; Catalog Code:   olopatadine ophthalmic ; Order Dt/Tm:   7/6/2017 3:55:40 PM            Home Meds    calcium carbonate  :   calcium carbonate ; Status:   Documented ; Ordered As Mnemonic:   Tums Ultra 1000 mg oral tablet, chewable ; Simple Display Line:   Up to 5 tabs daily ; Catalog Code:   calcium carbonate ; Order Dt/Tm:   11/11/2010 1:34:11 PM

## 2022-02-15 NOTE — TELEPHONE ENCOUNTER
---------------------  From: Lavonne Armenta   To: SABINA CANALES    Sent: 11/11/2020 4:03:46 PM CST  Subject: General Message     Here are your results which we discussed in detail on the phone, Sabina. I sent refills of Valtrex 1 gram daily for 5 days for you to  and keep on hand for recurrent out breaks.     Take care,    DEBORAH Nova      Results:  Date Result Name Ind Value Ref Range   11/9/2020 2:11 PM Chlam/N. gonorrhea Comments  See comment    11/9/2020 2:11 PM HSV Type 1 ((A)) ISOLATED    11/9/2020 2:11 PM HSV Type 2  NOT ISOLATED    11/9/2020 2:11 PM Chlamydia RNA  NOT DETECTED (NOT DETECTED - )   11/9/2020 2:11 PM Neisseria gonorrhoeae RNA  NOT DETECTED (NOT DETECTED - )   11/9/2020 2:11 PM Culture HSV ((A)) ISOLATED    11/9/2020 2:11 PM Microbiology Spec Source  [facility]

## 2022-02-15 NOTE — TELEPHONE ENCOUNTER
---------------------  From: Keyshawn LIRIANO Inland   To: INGRID CANALES    Sent: 2/25/2021 8:26:02 AM CST  Subject: General Message     These are acceptable, please keep scheduled follow up appointments.    Timothy Mahajan MD      Results:  Date Result Name Value Ref Range   2/23/2021 3:32 PM Chlam/N. gonorrhea Comments See comment    2/23/2021 3:32 PM Chlamydia RNA NOT DETECTED (NOT DETECTED - )   2/23/2021 3:32 PM Neisseria gonorrhoeae RNA NOT DETECTED (NOT DETECTED - )

## 2022-02-15 NOTE — TELEPHONE ENCOUNTER
---------------------  From: Mary Early CMA (eRx Pool (32224_[facility]))   To: Advanced Practice Provider [place] ([address]);     Sent: 12/3/2021 10:13:24 AM CST  Subject: FW: Medication Management   Due Date/Time: 12/4/2021 8:49:00 AM CST       PCP:   CHT- Out of clinic    Medication:   Sumatriptan  Last Filled:  9/14/2021    Quantity:  15  Refills:  0  CSA on file?   N/A     Date of last office visit and reason:   11/11/2021 Smoking w/ [facility]  Date of last labs pertaining to condition:  _    Note:  Advise as C[facility] is out of clinic    Return to Clinic order placed?  Due back Feb 2022 per [facility] RTC    Resource:   [employer]  Phone:   _          ------------------------------------------  From: [facility] #[id number]  [facility]  Sent: December 3, 2021 8:49:47 AM CST  Subject: Medication Management  Due: November 16, 2021 3:37:48 PM CST     ** On Hold Pending Signature **     Dispensed Drug: SUMAtriptan (SUMAtriptan 100 mg oral tablet), TAKE 1 TABLET BY MOUTH DAILY AS NEEDED FOR MIGRAINE HEADACHE AS DIRECTED  Quantity: 15 tab(s)  Days Supply: 15  Refills: 0  Substitutions Allowed  Notes from Pharmacy:  ---------------------------------------------------------------  From: Vinh Moyer PA-C   To: [facility] #[id number]    Sent: 12/3/2021 11:02:54 AM CST  Subject: FW: Medication Management     ** Submitted: **  Complete:SUMAtriptan (SUMAtriptan 100 mg oral tablet)   Signed by Vinh Moyer PA-C  12/3/2021 5:02:00 PM [facility]    ** Approved **  SUMAtriptan (SUMATRIPTAN 100MG TABLETS)  TAKE 1 TABLET BY MOUTH DAILY AS NEEDED FOR MIGRAINE HEADACHE AS DIRECTED  Qty:  15 tab(s)        Days Supply:  15        Refills:  0          Substitutions Allowed     Route To Pharmacy - [facility] #[id number]   Signed by Vinh Moyer PA-C

## 2022-02-15 NOTE — TELEPHONE ENCOUNTER
---------------------  From: Marci Tavarez LPN (Phone Messages Pool (32224_[facility]))   To: Phone Messages Pool (32224_[facility]);     Cc: Oni LIRIANO Rohan;      Sent: 6/18/2021 3:39:53 PM CDT  Subject: labs     Phone Message    PCP:   CHT asked for GJ      Time of Call:  3:30pm       Person Calling:  pt  Phone number:  [phone]    Returned call at: 3:35pm    Note:   Pt calling stating she had labs drawn today and was suppose to get a call with results. Pt says she has not heard anything.    Pt says she seen GJM 6/16 and was told to come back in today to have calcium rechecked. Pt says GJ told her if it was still low today that she will need to go back to the infusion center.    No results back yet. Checked with lab and they said results will be back today but probably not until around 5pm.     Called Sabina back and informed her of this. Told her I will continue to check for results to come back and if they come back before 5pm I will call [facility] as instructed in order.    Otherwise I told her I will send a message to whoever is working urgent care tomorrow to check right away in the morning.    Last office visit and reason:  6/16/21 hypocalcemiaTalked with Sabina yesterday. Calcium 6.9 (increased from 6.5). She overall feels better. Discussed with Nephrology. Will continue increased calcitriol 1mcg BID which should help bring up the calcium as the IV dose is temporary. Discussed hospitalization but patient decided to monitor at home and an adult will be there (boyfriend). EKG following IV calcium at infusion center without prolonged QT. Will follow up on Monday.

## 2022-02-15 NOTE — TELEPHONE ENCOUNTER
---------------------  From: Vinh Moyer PA-C   To: INGRID CANALES    Sent: 2/24/2021 3:36:24 PM CST  Subject: General Message       These are stable.    Results:  Date Result Name Ind Value Ref Range   2/23/2021 3:06 PM Sodium Level  141 mmol/L (135 - 146)   2/23/2021 3:06 PM Potassium Level  3.9 mmol/L (3.5 - 5.3)   2/23/2021 3:06 PM Chloride Level  105 mmol/L (98 - 110)   2/23/2021 3:06 PM CO2 Level  27 mmol/L (20 - 32)   2/23/2021 3:06 PM Glucose Level  97 mg/dL (65 - 99)   2/23/2021 3:06 PM BUN  15 mg/dL (7 - 25)   2/23/2021 3:06 PM Creatinine Level  1.07 mg/dL (0.50 - 1.10)   2/23/2021 3:06 PM Calcium Level  8.6 mg/dL (8.6 - 10.2)   2/23/2021 3:06 PM T4 Free  1.8 ng/dL (0.8 - 1.8)   2/23/2021 3:06 PM T3 Free  2.9 pg/mL (2.3 - 4.2)   2/23/2021 3:06 PM TSH ((L)) 0.08 mIU/L

## 2022-02-15 NOTE — TELEPHONE ENCOUNTER
---------------------  From: Ana María Lopez CMA (Phone Messages Pool (32224_[facility]))   Sent: 11/9/2020 10:17:40 AM CST  Subject: General Message     Phone Message    PCP:    CHT     Time of Call:  0928       Person Calling:  Patient   Phone number:  [phone]    Returned call at: 1010    Note:   Would like to schedule an appointment covid was negative and only symptoms was headache history of frequent headaches. Will call back to schedule.    Last office visit and reason:  _

## 2022-02-15 NOTE — TELEPHONE ENCOUNTER
---------------------  From: Erin Jackson CMA (Phone Messages Pool ([address] [place] [address]))   To: Advanced Practice Provider Pool ([address] [place] [address]);     Sent: 2/22/2021 1:10:34 PM CST  Subject: FW: Diflucan           ---------------------  From: INGRID CANALES  To: [facility]  Sent: 02/22/2021 12:55 p.m. CST  Subject: Diflucan  Wondering if I can get a dose of Diflucan called in for a yeast infection.  I use the [facility] pharmacy in [place] [facility].  Thank you!---------------------  From: Sidra Patiño PA-C ([facility] Provider Pool ([address] [place] [address]))   To: Phone Messages Pool ([address] [place] [address]);     Sent: 2/22/2021 1:29:53 PM CST  Subject: RE: Diflucan     If she would like RX of diflucan it would be best to have a visit to make sure it is a yeast infection and not other cause of discomfort/discharge in that area particularly due to recent visit for HSV (ensure it is yeast and not early HSV outbreak causing discomfort/swelling or BV as a cause of discharge.---------------------  From: Jaquelin Quiroz (Phone Messages Pool ([address] [place] [address]))   To: Advanced Practice Provider [place] ([address] [place] [address]);     Sent: 2/22/2021 2:32:33 PM CST  Subject: RE: Diflucan     left msg for pt to call back---------------------  From: Jaquelin Quiroz (Phone Messages Pool ([address] [place] [address]))   To: Advanced Practice Provider Pool ([address] [place] [address]);     Sent: 2/22/2021 3:09:58 PM CST  Subject: RE: Diflucan     Pt reached and was transfered to scheduling line for appt.    Jaquelin DE JESUS

## 2022-02-15 NOTE — TELEPHONE ENCOUNTER
---------------------  From: Rohan Bunn MD   To: SABINA CANALES    Sent: 6/17/2021 10:46:20 AM CDT  Subject: Labs     Dear Sabina    Calcium 6.5      Results:  Date Result Name Ind Value Ref Range   6/16/2021 5:50 PM Sodium Level  140 mmol/L (135 - 146)   6/16/2021 5:50 PM Potassium Level  4.1 mmol/L (3.5 - 5.3)   6/16/2021 5:50 PM Chloride Level  106 mmol/L (98 - 110)   6/16/2021 5:50 PM CO2 Level  26 mmol/L (20 - 32)   6/16/2021 5:50 PM Glucose Level  92 mg/dL (65 - 99)   6/16/2021 5:50 PM BUN  11 mg/dL (7 - 25)   6/16/2021 5:50 PM Creatinine Level ((H)) 1.35 mg/dL (0.50 - 1.10)   6/16/2021 5:50 PM BUN/Creat Ratio  8 (6 - 22)   6/16/2021 5:50 PM eGFR ((L)) 49 mL/min/1.73m2 (> OR = 60 - )   6/16/2021 5:50 PM eGFR African American ((L)) 57 mL/min/1.73m2 (> OR = 60 - )   6/16/2021 5:50 PM Calcium Level ((L)) 6.5 mg/dL (8.6 - 10.2)     Edwin Bunn M.D.

## 2022-02-15 NOTE — PROGRESS NOTES
Patient:   INGRID CANALES            MRN: [id number]            FIN: [id number]               Age:   41 years     Sex:  Female     :  1980   Associated Diagnoses:   Hypocalcemia; Hypoparathyroidism   Author:   Rohan Bunn MD      Visit Information      Date of Service: 2021 09:53 am  Performing Location: [facility]  Encounter#: [id number]      Primary Care Provider (PCP):  Vladimir Mahajan MD    NPI# [id number]      Referring Provider:  Rohan Bunn MD    NPI# [id number]      Chief Complaint   2021 9:57 AM CDT    Follow up hypocalcemia      History of Present Illness   Had Thyroidectomy age 21 for goiter. Thinks has two parathyroid glands left seen during surgery but likely not very functional. Has been taking calcium and calcitriol daily since the surgery and usually takes care of it. Has had to take IV calcium about a dozen times but need ed it more in the beginning following the surgery. Started feeling muscle pains and twitching last week with spasms in forelegs and arms. Calcium 6.5 with IV calcium given Infusion center. Calcium 6.9 following day. Increased calcitriol to 1mcg twice daily. Symptoms have decreased but not fully resolved over the weekend. EKG with  msec      Review of Systems   Respiratory:  No shortness of breath.    Gastrointestinal:  No vomiting.       Health Status   Allergies:    Allergic Reactions (Selected)  Severity Not Documented  Rondec (Rash)  Nonallergic Reactions (Selected)  Severity Not Documented  Wellbutrin XL (Rash)   Medications:  (Selected)   Prescriptions  Prescribed  Pataday 0.2% ophthalmic solution: 1 drop(s), both eyes, daily, # 2.5 mL, 3 Refill(s), Type: Maintenance, Pharmacy: [facility] #[id number], 1 drop(s) Eye-Both daily  SUMAtriptan 100 mg oral tablet: = 1 tab(s), Oral, daily, Instructions: AS DIRECTED., PRN: AS NEEDED FOR MIGRAINE HEADACHE, # 15 tab(s), 1 Refill(s), Type: Soft Stop, Pharmacy: [facility]  #[id number], 1 tab(s) Oral daily,PRN:AS NEEDED FOR MIGRAINE HEADACHE,Instr:AS DIRECTED., 62...  calcitriol 0.5 mcg oral capsule: = 2 cap(s), Oral, daily, # 180 cap(s), 3 Refill(s), Type: Maintenance, Pharmacy: [facility] #[id number], 2 cap(s) Oral daily, 62.5, in, 20 13:23:00 CST, Height Measured, 138, lb, 21 14:31:00 CST, Weight Measured  levothyroxine 150 mcg (0.15 mg) oral tablet: = 1 tab(s), Oral, daily, # 90 tab(s), 3 Refill(s), Type: Maintenance, Pharmacy: [facility] #[id number], 1 tab(s) Oral daily, 62.5, in, 20 13:23:00 CST, Height Measured, 138, lb, 21 14:31:00 CST, Weight Measured  topiramate 50 mg oral tablet: = 1 tab(s), Oral, daily, # 90 tab(s), 3 Refill(s), Type: Maintenance, Pharmacy: [facility] #[id number], 1 tab(s) Oral daily, 62.5, in, 20 13:23:00 CST, Height Measured, 138, lb, 21 14:31:00 CST, Weight Measured  Documented Medications  Documented  Prenatal Multivitamins: Oral, daily, 0 Refill(s), Type: Maintenance  Tums Ultra 1000 mg oral tablet, chewable: Instructions: Up to 5 tabs daily, 0 Refill(s)   Problem list:    All Problems  Acquired hypothyroidism / SNOMED CT 637052199 / Confirmed  Hypocalcemia / SNOMED CT 7746436 / Confirmed  Migraines / SNOMED CT 73874159 / Confirmed  Mild depression / SNOMED CT 904826173 / Confirmed  Obesity / SNOMED CT 1375569580 / Probable  Hypoparathyroidism / SNOMED CT 350046284 / Confirmed  Tobacco abuse / ICD-9-.1 / Confirmed  Resolved: *Hospitalized@[facility] - Hypocalcemia  Resolved: AMA (advanced maternal age) multigravida 35+ / SNOMED CT 1350794406  Resolved: Pregnancy / SNOMED CT 874639009  Resolved: Pregnancy / SNOMED CT 723223783  Resolved: Pregnant / SNOMED CT 700902959      Histories   Procedure history:     delivery (SNOMED CT 1012200192) on 2016 at 36 Years.  HSG - Hysterosalpingogram (SNOMED CT 242326087) performed by Jairo Bales DO on 10/5/2015 at 35 Years.  Appendectomy (SNOMED CT  486819807) on 2010 at 30 Years.  Laparoscopic cholecystectomy (SNOMED CT 69092698) on 2010 at 30 Years.   section (SNOMED CT 98994168) on 2007 at 26 Years.  Thyroidectomy (SNOMED CT 17459975) performed by Eben Landry MD on 3/18/2003 at 23 Years.  Ts and As - Tonsillectomy and adenoidectomy (SNOMED CT 0518048733) in  at 13 Years.  Myringotomy and insertion of T tube (SNOMED CT 551720269).      Physical Examination   Vital Signs   2021 9:57 AM CDT Temperature Tympanic 97.9 DegF    Peripheral Pulse Rate 78 bpm    Pulse Site Radial artery    HR Method Manual    Systolic Blood Pressure 110 mmHg    Diastolic Blood Pressure 62 mmHg    Mean Arterial Pressure 78 mmHg    BP Site Right arm    BP Method Manual      Measurements from flowsheet : Measurements   2021 9:57 AM CDT Height Measured - Standard 62.5 in    Height/Length Estimated 62.5 in    Weight Measured - Standard 140.7 lb    BSA 1.68 m2    Body Mass Index 25.32 kg/m2  HI      General:  Alert and oriented, No acute distress.    Neck:  No lymphadenopathy.    Respiratory:  Lungs are clear to auscultation.    Cardiovascular:  Normal rate, Regular rhythm.    Musculoskeletal:  Normal gait.    Psychiatric:  Appropriate mood & affect.       Review / Management   EKG: Sinus rhythm. Normal QTc and MS interval with no qwaves or ST changes. Good R wave progression       Impression and Plan   Diagnosis     Hypocalcemia (ZWX85-MM E83.51).     Hypoparathyroidism (XNV15-GB E89.2).       Continue increased calcitriol. Recheck labs today and consult Nephrology next week

## 2022-02-15 NOTE — PROGRESS NOTES
Patient:   INGRID CANALES            MRN: [id number]            FIN: [id number]               Age:   36 years     Sex:  Female     :  1980   Associated Diagnoses:   Acquired hypothyroidism; Hypoparathyroidism; Fatigue; Hypocalcemia; Well adult exam   Author:   Sammy Jensen MD      Visit Information   Visit type:  Annual exam.    Source of history:  Self.    History limitation:  None.       Chief Complaint   2017 9:18 AM CST    Annual exam.      Well Adult History   Well Adult History             The patient presents for well adult exam.  The patient's general health status is described as good.  Due for labs. 4 months postpartum. Pumping breast milk..  The patient's diet is described as balanced.  Exercise: none.  Associated symptoms consist of none.  Medical encounters: none.        Review of Systems   Constitutional:  Fatigue.    Respiratory:  Negative.    Cardiovascular:  Negative.    Psychiatric:  Negative.    All other systems reviewed and negative      Health Status   Allergies:    Allergic Reactions (Selected)  Severity Not Documented  Rondec (Rash)  Nonallergic Reactions (Selected)  Severity Not Documented  Wellbutrin XL (Rash)   Medications:  (Selected)   Prescriptions  Prescribed  Metoprolol Tartrate 25 mg oral tablet: 1 tab(s) ( 25 mg ), po, daily, # 30 tab(s), 0 Refill(s), Type: Maintenance, Pharmacy: [facility] #[id number]  SUMAtriptan 100 mg oral tablet: See Instructions, Instructions: TAKE 1 TABLET BY MOUTH ONCE AS NEEDED FOR MIGRAINE, # 9 tab(s), 9 Refill(s), Type: Soft Stop, Pharmacy: [facility] #[id number], TAKE 1 TABLET BY MOUTH ONCE AS NEEDED FOR MIGRAINE  calcitriol 0.5 mcg oral capsule: 2 cap(s) ( 1 mcg ), po, daily, # 60 cap(s), 0 Refill(s), Type: Maintenance, [id number]  cyclobenzaprine 10 mg oral tablet: See Instructions, Instructions: TAKE 1 TABLET BY MOUTH THREE TIMES DAILY, # 30 tab(s), Type: Soft Stop, [id number], TAKE 1 TABLET BY MOUTH  THREE TIMES DAILY  Documented Medications  Documented  Tums Ultra 1000 mg oral tablet, chewable: Instructions: Up to 5 tabs daily, 0 Refill(s)  levothyroxine 150 mcg (0.15 mg) oral capsule: 1 cap(s) ( 150 mcg ), po, daily, 0 Refill(s), Type: Maintenance   Problem list:    All Problems  Acquired hypothyroidism / SNOMED CT 263083871 / Confirmed  Hypocalcemia / SNOMED CT 3398760 / Confirmed  Migraines / SNOMED CT 48741590 / Confirmed  Mild depression / SNOMED CT 570395332 / Confirmed  AMA (advanced maternal age) multigravida 35+ / SNOMED CT 3452372236 / Confirmed  Obesity / SNOMED CT 5546229808 / Probable  Hypoparathyroidism / SNOMED CT 133222460 / Confirmed  Tobacco abuse / ICD-9-.1 / Confirmed  Resolved: *Hospitalized@[facility] - Hypocalcemia  Resolved: Pregnancy / SNOMED CT 856310488  Resolved: Pregnancy / SNOMED CT 331966895  Resolved: Pregnant / SNOMED CT 321795028      Histories   Past Medical History:    Active  Acquired hypothyroidism (SNOMED CT 624174120)  Hypoparathyroidism (SNOMED CT 514574695)  Mild depression (SNOMED CT 587277776)  Tobacco abuse (ICD-9-.1)  Hypocalcemia (SNOMED CT 3338307)  Resolved  *Hospitalized@[facility] - Hypocalcemia: Onset on 2013 at 33 years.  Resolved on 2013 at 33 years.  Pregnancy (SNOMED CT 448887303):  Resolved in  at 18 years.  Pregnancy (SNOMED CT 932195787):  Resolved in  at 26 years.   Family History:    Alive and well  Mother  Father     Procedure history:     delivery (9716664389) on 2016 at 36 Years.  HSG - Hysterosalpingogram (868811071) on 10/5/2015 at 35 Years.  Appendectomy (709741768) on 2010 at 30 Years.  Laparoscopic cholecystectomy (59003869) on 2010 at 30 Years.   section (17424916) on 2007 at 26 Years.  Thyroidectomy (68242349) on 3/18/2003 at 23 Years.  Ts and As - Tonsillectomy and adenoidectomy (9837314026) in 1993 at 13 Years.  Myringotomy and insertion of T tube (104245370).   Social History:         Alcohol Assessment: Current            1-2 times per month      Tobacco Assessment: Current            Cigarettes, 3 per day.      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      Home and Environment Assessment            Marital status:  (Living together).  Spouse/Partner name: Ravindra.  Lives with Children, Spouse.  2               children.  Living situation: Home/Independent.      Nutrition and Health Assessment            Type of diet: Regular.      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      Other Assessment            First menses age 14.        Physical Examination   Last Menstrual Period: 12/28/2016   Vital Signs   1/18/2017 9:18 AM CST Temperature Tympanic 98.8 DegF    Peripheral Pulse Rate 72 bpm    Pulse Site Radial artery    HR Method Manual    Systolic Blood Pressure 98 mmHg    Diastolic Blood Pressure 62 mmHg    Mean Arterial Pressure 74 mmHg    BP Site Right arm    BP Method Manual      Measurements from flowsheet : Measurements   1/18/2017 9:18 AM CST Height Measured - Standard 63 in    Weight Measured - Standard 148.1 lb    BSA 1.73 m2    Body Mass Index 26.23 kg/m2      General:  Alert and oriented, No acute distress.    Eye:  Pupils are equal, round and reactive to light, Extraocular movements are intact, Normal conjunctiva.    HENT:  Normocephalic, Tympanic membranes are clear, Oral mucosa is moist, No pharyngeal erythema.    Neck:  Supple, Non-tender, No lymphadenopathy, No thyromegaly.    Respiratory:  Lungs are clear to auscultation.    Cardiovascular:  Normal rate, Regular rhythm.    Breast:  No mass, No tenderness, No discharge.     Gastrointestinal:  Soft, Non-tender, Non-distended, No organomegaly.    Musculoskeletal:  Normal range of motion, Normal strength.    Integumentary:  Warm, Dry, Pink, No rash, no concerning lesions.    Neurologic:  Alert, Oriented, Normal sensory.    Psychiatric:  Cooperative, Appropriate mood & affect.       Review / Management   Results review      Impression and Plan   Diagnosis     Acquired hypothyroidism (ZGC51-BG E03.8).     Hypoparathyroidism (LEB22-CQ E89.2).     Fatigue (YNN80-QF R53.83).     Hypocalcemia (CNX10-QT E83.51).     Well adult exam (FHM56-BA Z00.00).     Course:  Progressing as expected.    Patient Instructions:       Counseled: Patient, BMI, diet, and exercise.    Orders     Orders (Selected)   Outpatient Orders  Ordered  Return to Clinic (Request): RFV: Annual exam, Return in 1 year  Completed  Basic Metabolic Panel (Request): Acquired hypothyroidism  Hypoparathyroidism  Fatigue  Hypocalcemia  Hemoglobin (Request): Acquired hypothyroidism  Hypoparathyroidism  Fatigue  Hypocalcemia  Phosphorous (Request): Acquired hypothyroidism  Hypoparathyroidism  Fatigue  Hypocalcemia  T4 Free (Request): Acquired hypothyroidism  Hypoparathyroidism  Fatigue  Hypocalcemia  TSH (Request): Acquired hypothyroidism  Hypoparathyroidism  Fatigue  Hypocalcemia  Documented Medications  Documented  levothyroxine 150 mcg (0.15 mg) oral capsule: 1 cap(s) ( 150 mcg ), po, daily, 0 Refill(s), Type: Maintenance.

## 2022-02-15 NOTE — PROGRESS NOTES
Patient:   INGRID CANALES            MRN: [id number]            FIN: [id number]               Age:   41 years     Sex:  Female     :  1980   Associated Diagnoses:   Hypocalcemia; Hypoparathyroidism; Acquired hypothyroidism; Vaginitis   Author:   Vladimir Mahajan MD      Visit Information      Date of Service: 2021 02:20 pm  Performing Location: [facility]  Encounter#: [id number]      Primary Care Provider (PCP):  Vladimir Mahajan MD    NPI# [id number]      Referring Provider:  Vladimir Mahajan MD    NPI# [id number]      Chief Complaint   2021 2:31 PM CST    Vaginal pain w/ slight discharge x4 days.     Chief complaint and symptoms noted above confirmed with patient.      History of Present Illness             The patient presents with vaginal discharge.  The vaginal discharge is described as a small amount.  The severity of the vaginal discharge is moderate.  The vaginal discharge is constant.  The vaginal discharge has lasted for 4 day(s).  Exacerbating factors consist of none.  Relieving factors consist of none.        Interval History   Hypothyroidism   Thyroid function tests Pending.  The course is progressing as expected.        Review of Systems   Constitutional:  Negative.    Ear/Nose/Mouth/Throat:  Negative except as documented in history of present illness.    Respiratory:  Negative.    Cardiovascular:  Negative.       Health Status   Allergies:    Allergic Reactions (Selected)  Severity Not Documented  Rondec (Rash)  Nonallergic Reactions (Selected)  Severity Not Documented  Wellbutrin XL (Rash)   Medications:  (Selected)   Prescriptions  Prescribed  Diflucan 200 mg oral tablet: = 1 tab(s) ( 200 mg ), Oral, qweek, # 2 tab(s), 1 Refill(s), Type: Acute, [id number], 1 tab(s) Oral qweek, 62.5, in, 20 13:23:00 CST, Height Measured, 138, lb, 21 14:31:00 CST, Weight Measured  Pataday 0.2% ophthalmic solution: 1 drop(s), both eyes,  daily, # 2.5 mL, 3 Refill(s), Type: Maintenance, Pharmacy: [facility] #[id number], 1 drop(s) Eye-Both daily  SUMAtriptan 100 mg oral tablet: = 1 tab(s), Oral, daily, Instructions: AS DIRECTED., PRN: AS NEEDED FOR MIGRAINE HEADACHE, # 15 tab(s), 1 Refill(s), Type: Soft Stop, Pharmacy: [facility] #[id number], 1 tab(s) Oral daily,PRN:AS NEEDED FOR MIGRAINE HEADACHE,Instr:AS DIRECTED., 62...  calcitriol 0.5 mcg oral capsule: = 2 cap(s), Oral, daily, # 180 cap(s), 3 Refill(s), Type: Maintenance, Pharmacy: [facility] #[id number], 2 cap(s) Oral daily, 62.5, in, 11/09/20 13:23:00 CST, Height Measured, 138, lb, 02/23/21 14:31:00 CST, Weight Measured  levothyroxine 150 mcg (0.15 mg) oral tablet: = 1 tab(s), Oral, daily, # 90 tab(s), 3 Refill(s), Type: Maintenance, Pharmacy: [facility] #[id number], 1 tab(s) Oral daily, 62.5, in, 11/09/20 13:23:00 CST, Height Measured, 138, lb, 02/23/21 14:31:00 CST, Weight Measured  metroNIDAZOLE 0.75% vaginal gel with applicator: 1 cade, VAG, once, # 70 gm, 0 Refill(s), Type: Soft Stop, Pharmacy: [facility] #[id number], 1 cade Vaginal once, 62.5, in, 11/09/20 13:23:00 CST, Height Measured, 138, lb, 02/23/21 14:31:00 CST, Weight Measured  topiramate 50 mg oral tablet: = 1 tab(s), Oral, daily, # 90 tab(s), 3 Refill(s), Type: Maintenance, Pharmacy: [facility] #[id number], 1 tab(s) Oral daily, 62.5, in, 11/09/20 13:23:00 CST, Height Measured, 138, lb, 02/23/21 14:31:00 CST, Weight Measured  Documented Medications  Documented  Prenatal Multivitamins: Oral, daily, 0 Refill(s), Type: Maintenance  Tums Ultra 1000 mg oral tablet, chewable: Instructions: Up to 5 tabs daily, 0 Refill(s)   Problem list:    All Problems (Selected)  Acquired hypothyroidism / SNOMED CT 950779720 / Confirmed  Obesity / SNOMED CT 2772362795 / Probable  Hypoparathyroidism / SNOMED CT 765571756 / Confirmed  Tobacco abuse / ICD-9-.1 / Confirmed  Mild depression / SNOMED CT 168176530 /  Confirmed  Migraines / SNOMED CT 65946020 / Confirmed  Hypocalcemia / SNOMED CT 1998612 / Confirmed      Histories   Past Medical History:    Active  Acquired hypothyroidism (SNOMED CT 040401669)  Hypoparathyroidism (SNOMED CT 358994429)  Mild depression (SNOMED CT 641186650)  Tobacco abuse (ICD-9-.1)  Hypocalcemia (SNOMED CT 5128926)  Migraines (SNOMED CT 92846764)  Resolved  *Hospitalized@[facility] - Hypocalcemia: Onset on 2013 at 33 years.  Resolved on 2013 at 33 years.  Pregnancy (SNOMED CT 879784280):  Resolved in  at 18 years.  Pregnancy (SNOMED CT 110482545):  Resolved in  at 26 years.  AMA (advanced maternal age) multigravida 35+ (SNOMED CT 9863426176):  Resolved.   Family History:    Alive and well  Mother  Father     Procedure history:     delivery (SNOMED CT 8982031130) on 2016 at 36 Years.  HSG - Hysterosalpingogram (SNOMED CT 732153742) performed by Jairo Bales DO on 10/5/2015 at 35 Years.  Appendectomy (SNOMED CT 472715207) on 2010 at 30 Years.  Laparoscopic cholecystectomy (SNOMED CT 81242069) on 2010 at 30 Years.   section (SNOMED CT 34838703) on 2007 at 26 Years.  Thyroidectomy (SNOMED CT 38107434) performed by Eben Landry MD on 3/18/2003 at 23 Years.  Ts and As - Tonsillectomy and adenoidectomy (SNOMED CT 3103466316) in  at 13 Years.  Myringotomy and insertion of T tube (SNOMED CT 514863525).   Social History:        Electronic Cigarette/Vaping Assessment            Electronic Cigarette Use: Never.      Alcohol Assessment: Current            1 x per month, 3 drinks/episode average.  5 drinks/episode maximum.      Tobacco Assessment: Current            Cigarettes, 3 per day.            Smoker, current status unknown      Substance Abuse Assessment: Denies Substance Abuse            Never      Employment and Education Assessment            Employed                     Comments:                      2011 - [employer]  [employer]                     Registered Nurse.      Home and Environment Assessment            Marital status:  (Living together).  Spouse/Partner name: Ravindra.  Lives with Children, Spouse.  2               children.  Living situation: Home/Independent.      Nutrition and Health Assessment            Type of diet: Regular.      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      Other Assessment            First menses age 16.  Irregular menses.  Menstrual duration 5 days.  Cycle interval 23 days.  No history of               abnormal Pap smear.        Physical Examination   Vital Signs   2/23/2021 2:31 PM CST Temperature Tympanic 98.3 DegF    Peripheral Pulse Rate 78 bpm    Systolic Blood Pressure 114 mmHg    Diastolic Blood Pressure 72 mmHg    Mean Arterial Pressure 86 mmHg    BP Site Right arm    BP Method Manual    Oxygen Saturation 99 %      Measurements from flowsheet : Measurements   2/23/2021 2:31 PM CST Height/Length Estimated 62.5 in    Weight Measured - Standard 138 lb      General:  Alert and oriented.    HENT:  Normocephalic, Tympanic membranes are clear.    Neck:  Supple, Non-tender, No carotid bruit.    Respiratory:  Lungs are clear to auscultation, Respirations are non-labored, Breath sounds are equal.    Genitourinary:  No costovertebral angle tenderness.         Vagina: Discharge ( White ), No lesions.       Review / Management   Results review:  Lab results   2/23/2021 3:05 PM CST Wet Prep Yeast None Seen    Wet Prep Trichomonas None Seen    Wet Prep Clue Cells Present   2/23/2021 2:33 PM CST UA pH 7.0    UA Specific Gravity 1.010    UA Glucose NEGATIVE    UA Bilirubin NEGATIVE    UA Ketones NEGATIVE    Urine Occult Blood NEGATIVE    UA Protein NEGATIVE    UA Nitrite NEGATIVE    UA Leukocyte Esterase NEGATIVE   .       Impression and Plan   Diagnosis      Hypocalcemia (MHW34-RV E83.51).     Hypoparathyroidism (MFL04-QI E89.2).     Acquired hypothyroidism (GSA36-JM E03.8).     Course:  Progressing as expected.    Orders     Orders (Selected)   Outpatient Orders  Ordered (In Transit)  Basic Metabolic Panel* ([facility]): Specimen Type: Serum, Collection Date: 02/23/21 14:58:00 CST  Chlamydia/Neisseria gonorrhoeae RNA, TMA* ([facility]): Specimen Type: Swab, Collection Date: 02/23/21 14:54:00 CST  Culture, Urine, Routine* ([facility]): Specimen Type: Urine (Clean Catch), Collection Date: 02/23/21 14:21:00 CST  T3, free* ([facility]): Specimen Type: Serum, Collection Date: 02/23/21 14:58:00 CST  T4, free* ([facility]): Specimen Type: Serum, Collection Date: 02/23/21 14:58:00 CST  TSH* ([facility]): Specimen Type: Serum, Collection Date: 02/23/21 14:58:00 CST  Prescriptions  Prescribed  calcitriol 0.5 mcg oral capsule: = 2 cap(s), Oral, daily, # 180 cap(s), 3 Refill(s), Type: Maintenance, Pharmacy: [facility] #[id number], 2 cap(s) Oral daily, 62.5, in, 11/09/20 13:23:00 CST, Height Measured, 138, lb, 02/23/21 14:31:00 CST, Weight Measured  levothyroxine 150 mcg (0.15 mg) oral tablet: = 1 tab(s), Oral, daily, # 90 tab(s), 3 Refill(s), Type: Maintenance, Pharmacy: [facility] #[id number], 1 tab(s) Oral daily, 62.5, in, 11/09/20 13:23:00 CST, Height Measured, 138, lb, 02/23/21 14:31:00 CST, Weight Measured.     Will adjust Synthroid dose base on labs..     Diagnosis     Vaginitis (KEP85-PZ N76.0).     Course:  Not improving.    Orders     Orders (Selected)   Prescriptions  Prescribed  Diflucan 200 mg oral tablet: = 1 tab(s) ( 200 mg ), Oral, qweek, # 2 tab(s), 1 Refill(s), Type: Acute, Pharmacy: [facility] #[id number], 1 tab(s) Oral qweek, 62.5, in, 11/09/20 13:23:00 CST, Height Measured, 138, lb, 02/23/21 14:31:00 CST, Weight Measured  metroNIDAZOLE 0.75% vaginal gel with applicator: 1 cade, VAG, once, # 70 gm, 0 Refill(s), Type: Soft Stop, Pharmacy: [facility] #[id number], 1 cade  Vaginal once, 62.5, in, 11/09/20 13:23:00 CST, Height Measured, 138, lb, 02/23/21 14:31:00 CST, Weight Measured.

## 2022-02-15 NOTE — TELEPHONE ENCOUNTER
---------------------  From: Cori Granado RN   To: [facility] Message Pool ([id number] - [place]);     Sent: 6/21/2021 1:04:53 PM CDT  Subject: labs FYI     Time of Call:  1158  Return call at:1255     Person Calling:  [facility]  Phone number:  [phone]    Note:   Labs to be called to [facility] from 6/18/21 see BMP. Patient was already seen in clinic today, Re hypocalcemia.SANTOS and pt are aware of result from Friday.

## 2022-03-02 NOTE — TELEPHONE ENCOUNTER
---------------------  From: Kareen Cook RN   Sent: 1/4/2022 11:27:26 AM CST  Subject: No smell/taste     Pt had COVID vaccine Thursday. Friday had headache and muscle aches. Sat/Sun had congestions turning into loss of smell. Monday had no smell or taste.  Is looking to have COVID swab with rapid results or not have to wait 48 hours. I offered her a phone visit with possibility to be tested today but said result may not be back up to 72 hours.  Pt is going to call around and see about other testing policies and turn around times.

## 2022-03-02 NOTE — LETTER
(Inserted Image. Unable to display)   February 16, 2022  INGRID CANALES  [address] [place] [address]        Dear INGRID,    Thank you for selecting [facility] for your healthcare needs.    Our records indicate you are due for the following services:     Medication Check      (FYI   Regarding office visits: In some instances, a video visit or telephone visit may be offered as an option.)    To schedule an appointment or if you have further questions, please contact your clinic at [phone].    Powered by [employer]    Sincerely,    Vladimir Mahajan MD

## 2022-03-10 DIAGNOSIS — G43.009 MIGRAINE WITHOUT AURA AND WITHOUT STATUS MIGRAINOSUS, NOT INTRACTABLE: Primary | ICD-10-CM

## 2022-03-12 RX ORDER — SUMATRIPTAN 100 MG/1
100 TABLET, FILM COATED ORAL
Qty: 15 TABLET | Refills: 1 | Status: SHIPPED | OUTPATIENT
Start: 2022-03-12 | End: 2022-03-24

## 2022-03-15 ENCOUNTER — DOCUMENTATION ONLY (OUTPATIENT)
Dept: LAB | Facility: CLINIC | Age: 42
End: 2022-03-15
Payer: COMMERCIAL

## 2022-03-15 DIAGNOSIS — E04.9 GOITER: Primary | ICD-10-CM

## 2022-03-15 DIAGNOSIS — E83.51 HYPOCALCEMIA: ICD-10-CM

## 2022-03-15 DIAGNOSIS — E89.2 SURGICAL HYPOPARATHYROIDISM (H): ICD-10-CM

## 2022-03-15 DIAGNOSIS — E03.9 ACQUIRED HYPOTHYROIDISM: ICD-10-CM

## 2022-03-15 PROBLEM — G43.909 MIGRAINE HEADACHE: Status: ACTIVE | Noted: 2022-03-15

## 2022-03-15 PROBLEM — E66.9 OBESITY: Status: ACTIVE | Noted: 2022-03-15

## 2022-03-22 RX ORDER — BUPROPION HYDROCHLORIDE 150 MG/1
150 TABLET, EXTENDED RELEASE ORAL 2 TIMES DAILY
COMMUNITY
Start: 2021-11-11 | End: 2022-03-24

## 2022-03-22 RX ORDER — TOPIRAMATE 50 MG/1
1 TABLET, FILM COATED ORAL DAILY
COMMUNITY
Start: 2021-11-11 | End: 2022-03-24

## 2022-03-24 ENCOUNTER — OFFICE VISIT (OUTPATIENT)
Dept: FAMILY MEDICINE | Facility: CLINIC | Age: 42
End: 2022-03-24
Payer: COMMERCIAL

## 2022-03-24 VITALS
DIASTOLIC BLOOD PRESSURE: 58 MMHG | WEIGHT: 134 LBS | OXYGEN SATURATION: 99 % | HEART RATE: 68 BPM | BODY MASS INDEX: 23.74 KG/M2 | RESPIRATION RATE: 16 BRPM | SYSTOLIC BLOOD PRESSURE: 106 MMHG | HEIGHT: 63 IN

## 2022-03-24 DIAGNOSIS — E03.9 ACQUIRED HYPOTHYROIDISM: ICD-10-CM

## 2022-03-24 DIAGNOSIS — F43.23 ADJUSTMENT DISORDER WITH MIXED ANXIETY AND DEPRESSED MOOD: ICD-10-CM

## 2022-03-24 DIAGNOSIS — E04.9 GOITER: Primary | ICD-10-CM

## 2022-03-24 DIAGNOSIS — O16.3 ELEVATED BLOOD PRESSURE AFFECTING PREGNANCY IN THIRD TRIMESTER, ANTEPARTUM: ICD-10-CM

## 2022-03-24 DIAGNOSIS — E83.51 HYPOCALCEMIA: ICD-10-CM

## 2022-03-24 DIAGNOSIS — G43.709 CHRONIC MIGRAINE WITHOUT AURA WITHOUT STATUS MIGRAINOSUS, NOT INTRACTABLE: ICD-10-CM

## 2022-03-24 DIAGNOSIS — E89.2 SURGICAL HYPOPARATHYROIDISM (H): ICD-10-CM

## 2022-03-24 DIAGNOSIS — G43.009 MIGRAINE WITHOUT AURA AND WITHOUT STATUS MIGRAINOSUS, NOT INTRACTABLE: ICD-10-CM

## 2022-03-24 PROBLEM — H04.139: Status: ACTIVE | Noted: 2021-05-07

## 2022-03-24 LAB
ALBUMIN SERPL-MCNC: 4.2 G/DL (ref 3.4–5)
ALP SERPL-CCNC: 48 U/L (ref 40–150)
ALT SERPL W P-5'-P-CCNC: 20 U/L (ref 0–50)
ANION GAP SERPL CALCULATED.3IONS-SCNC: 7 MMOL/L (ref 3–14)
AST SERPL W P-5'-P-CCNC: 13 U/L (ref 0–45)
BILIRUB SERPL-MCNC: 0.4 MG/DL (ref 0.2–1.3)
BUN SERPL-MCNC: 16 MG/DL (ref 7–30)
CA-I BLD-MCNC: 4.3 MG/DL (ref 4.4–5.2)
CALCIUM SERPL-MCNC: 8.4 MG/DL (ref 8.5–10.1)
CHLORIDE BLD-SCNC: 108 MMOL/L (ref 94–109)
CO2 SERPL-SCNC: 26 MMOL/L (ref 20–32)
CREAT SERPL-MCNC: 1.23 MG/DL (ref 0.52–1.04)
ERYTHROCYTE [DISTWIDTH] IN BLOOD BY AUTOMATED COUNT: 13.4 % (ref 10–15)
GFR SERPL CREATININE-BSD FRML MDRD: 56 ML/MIN/1.73M2
GLUCOSE BLD-MCNC: 79 MG/DL (ref 70–99)
HCT VFR BLD AUTO: 45.1 % (ref 35–47)
HGB BLD-MCNC: 14.3 G/DL (ref 11.7–15.7)
MCH RBC QN AUTO: 29.4 PG (ref 26.5–33)
MCHC RBC AUTO-ENTMCNC: 31.7 G/DL (ref 31.5–36.5)
MCV RBC AUTO: 93 FL (ref 78–100)
PLATELET # BLD AUTO: 292 10E3/UL (ref 150–450)
POTASSIUM BLD-SCNC: 3.9 MMOL/L (ref 3.4–5.3)
PROT SERPL-MCNC: 7.6 G/DL (ref 6.8–8.8)
RBC # BLD AUTO: 4.87 10E6/UL (ref 3.8–5.2)
SODIUM SERPL-SCNC: 141 MMOL/L (ref 133–144)
T4 FREE SERPL-MCNC: 1.36 NG/DL (ref 0.76–1.46)
TSH SERPL DL<=0.005 MIU/L-ACNC: 0.19 MU/L (ref 0.4–4)
WBC # BLD AUTO: 5.3 10E3/UL (ref 4–11)

## 2022-03-24 PROCEDURE — 82330 ASSAY OF CALCIUM: CPT | Performed by: FAMILY MEDICINE

## 2022-03-24 PROCEDURE — 84439 ASSAY OF FREE THYROXINE: CPT | Performed by: FAMILY MEDICINE

## 2022-03-24 PROCEDURE — 80053 COMPREHEN METABOLIC PANEL: CPT | Performed by: FAMILY MEDICINE

## 2022-03-24 PROCEDURE — 36415 COLL VENOUS BLD VENIPUNCTURE: CPT | Performed by: FAMILY MEDICINE

## 2022-03-24 PROCEDURE — 84443 ASSAY THYROID STIM HORMONE: CPT | Performed by: FAMILY MEDICINE

## 2022-03-24 PROCEDURE — 85027 COMPLETE CBC AUTOMATED: CPT | Performed by: FAMILY MEDICINE

## 2022-03-24 PROCEDURE — 96127 BRIEF EMOTIONAL/BEHAV ASSMT: CPT | Mod: 59 | Performed by: FAMILY MEDICINE

## 2022-03-24 PROCEDURE — 99214 OFFICE O/P EST MOD 30 MIN: CPT | Performed by: FAMILY MEDICINE

## 2022-03-24 RX ORDER — CALCITRIOL 0.5 UG/1
1 CAPSULE, LIQUID FILLED ORAL DAILY
Qty: 180 CAPSULE | Refills: 3 | Status: SHIPPED | OUTPATIENT
Start: 2022-03-24 | End: 2023-07-28

## 2022-03-24 RX ORDER — LEVOTHYROXINE SODIUM 150 UG/1
150 TABLET ORAL DAILY
COMMUNITY
Start: 2022-02-21 | End: 2022-03-24

## 2022-03-24 RX ORDER — SUMATRIPTAN 100 MG/1
100 TABLET, FILM COATED ORAL
Qty: 15 TABLET | Refills: 11 | Status: SHIPPED | OUTPATIENT
Start: 2022-03-24 | End: 2023-03-24

## 2022-03-24 RX ORDER — LEVOTHYROXINE SODIUM 150 UG/1
150 TABLET ORAL DAILY
Qty: 90 TABLET | Refills: 3 | Status: SHIPPED | OUTPATIENT
Start: 2022-03-24 | End: 2023-02-06

## 2022-03-24 RX ORDER — TOPIRAMATE 50 MG/1
50 TABLET, FILM COATED ORAL DAILY
Qty: 90 TABLET | Refills: 3 | Status: SHIPPED | OUTPATIENT
Start: 2022-03-24 | End: 2023-02-01

## 2022-03-24 RX ORDER — BUPROPION HYDROCHLORIDE 300 MG/1
300 TABLET ORAL EVERY MORNING
Qty: 90 TABLET | Refills: 3 | Status: SHIPPED | OUTPATIENT
Start: 2022-03-24 | End: 2023-02-01

## 2022-03-24 ASSESSMENT — PATIENT HEALTH QUESTIONNAIRE - PHQ9
SUM OF ALL RESPONSES TO PHQ QUESTIONS 1-9: 17
10. IF YOU CHECKED OFF ANY PROBLEMS, HOW DIFFICULT HAVE THESE PROBLEMS MADE IT FOR YOU TO DO YOUR WORK, TAKE CARE OF THINGS AT HOME, OR GET ALONG WITH OTHER PEOPLE: SOMEWHAT DIFFICULT
SUM OF ALL RESPONSES TO PHQ QUESTIONS 1-9: 17

## 2022-03-24 ASSESSMENT — ANXIETY QUESTIONNAIRES
2. NOT BEING ABLE TO STOP OR CONTROL WORRYING: NEARLY EVERY DAY
GAD7 TOTAL SCORE: 17
GAD7 TOTAL SCORE: 17
1. FEELING NERVOUS, ANXIOUS, OR ON EDGE: NEARLY EVERY DAY
4. TROUBLE RELAXING: MORE THAN HALF THE DAYS
5. BEING SO RESTLESS THAT IT IS HARD TO SIT STILL: MORE THAN HALF THE DAYS
7. FEELING AFRAID AS IF SOMETHING AWFUL MIGHT HAPPEN: SEVERAL DAYS
7. FEELING AFRAID AS IF SOMETHING AWFUL MIGHT HAPPEN: SEVERAL DAYS
3. WORRYING TOO MUCH ABOUT DIFFERENT THINGS: NEARLY EVERY DAY
GAD7 TOTAL SCORE: 17
6. BECOMING EASILY ANNOYED OR IRRITABLE: NEARLY EVERY DAY

## 2022-03-24 NOTE — PROGRESS NOTES
Assessment & Plan     Goiter  Resolved surgically    Acquired hypothyroidism    - levothyroxine (SYNTHROID/LEVOTHROID) 150 MCG tablet; Take 1 tablet (150 mcg) by mouth daily  Will monitor T4 TSH    Hypocalcemia    - calcitRIOL (ROCALTROL) 0.5 MCG capsule; Take 2 capsules (1 mcg) by mouth daily  Will check calcium  Chronic migraine without aura without status migrainosus, not intractable    - topiramate (TOPAMAX) 50 MG tablet; Take 1 tablet (50 mg) by mouth daily  Refill sumatriptan as well    Surgical hypoparathyroidism (H)  Stable , monitor labs    Migraine without aura and without status migrainosus, not intractable    - SUMAtriptan (IMITREX) 100 MG tablet; Take 1 tablet (100 mg) by mouth at onset of headache for migraine    Adjustment disorder with mixed anxiety and depressed mood    - buPROPion (WELLBUTRIN XL) 300 MG 24 hr tablet; Take 1 tablet (300 mg) by mouth every morning  This has worked well for her.    Tobacco Cessation:   reports that she has been smoking cigarettes. She has a 3.50 pack-year smoking history. She has never used smokeless tobacco.  Tobacco Cessation Action Plan: Using Wellbutrin    Vladimir Mahajan MD  [facility]    Deyvi Muhammad is a 42 year old who presents for the following health issues: Medication refills    History of Present Illness       Mental Health Follow-up:  Patient presents to follow-up on Depression & Anxiety.Patient's depression since last visit has been:  No change  The patient is not having other symptoms associated with depression.  Patient's anxiety since last visit has been:  No change  The patient is not having other symptoms associated with anxiety.  Any significant life events: relationship concerns  Patient is feeling anxious or having panic attacks.  Patient has no concerns about alcohol or drug use.       Today's PHQ-9         PHQ-9 Total Score: 17  PHQ-9 Q9 Thoughts of better off dead/self-harm past 2 weeks :   (P) Not at  "all    How difficult have these problems made it for you to do your work, take care of things at home, or get along with other people: Somewhat difficult    Today's GILBERTO-7 Score: 17    Hypothyroidism:     Since last visit, patient describes the following symptoms::  Anxiety, Depression and Hair loss    Migraines:   Since the patient's last clinic visit, headaches are: no change  The patient is getting headaches:  1 or 2 times a week  She is able to do normal daily activities when she has a migraine.  The patient is taking the following rescue/relief medications:  Ibuprofen (Advil, Motrin), Excedrin and sumatriptan (Imitrex)   Patient states \"I get total relief\" from the rescue/relief medications.   The patient is taking the following medications to prevent migraines:  Topomax  In the past 4 weeks, the patient has gone to an Urgent Care or Emergency Room 0 times times due to headaches.    She eats 0-1 servings of fruits and vegetables daily.She consumes 0 sweetened beverage(s) daily.She exercises with enough effort to increase her heart rate 60 or more minutes per day.  She exercises with enough effort to increase her heart rate 7 days per week. She is missing 1 dose(s) of medications per week.       Depression Followup    How are you doing with your depression since your last visit? No change    Are you having other symptoms that might be associated with depression? No    Have you had a significant life event?  No     Are you feeling anxious or having panic attacks?   No    Do you have any concerns with your use of alcohol or other drugs? No    Social History     Tobacco Use     Smoking status: Current Every Day Smoker     Packs/day: 0.50     Years: 7.00     Pack years: 3.50     Types: Cigarettes     Smokeless tobacco: Never Used     Tobacco comment: less than 1/2 pack daily   Vaping Use     Vaping Use: Never used   Substance Use Topics     Alcohol use: Yes     Comment: occaisional     Drug use: Never     PHQ " "3/24/2022   PHQ-9 Total Score 17   Q9: Thoughts of better off dead/self-harm past 2 weeks Not at all     GILBERTO-7 SCORE 3/24/2022   Total Score 17 (severe anxiety)   Total Score 17     Last PHQ-9 3/24/2022   1.  Little interest or pleasure in doing things 1   2.  Feeling down, depressed, or hopeless 3   3.  Trouble falling or staying asleep, or sleeping too much 3   4.  Feeling tired or having little energy 2   5.  Poor appetite or overeating 2   6.  Feeling bad about yourself 3   7.  Trouble concentrating 1   8.  Moving slowly or restless 2   Q9: Thoughts of better off dead/self-harm past 2 weeks 0   PHQ-9 Total Score 17       Migraine     Since your last clinic visit, how have your headaches changed?  No change    How often are you getting headaches or migraines? Once a week     Are you able to do normal daily activities when you have a migraine? Yes    Are you taking rescue/relief medications? (Select all that apply) sumatriptan (Imitrex)    How helpful is your rescue/relief medication?  I get total relief    Are you taking any medications to prevent migraines? (Select all that apply)  Topamax    In the past 4 weeks, how often have you gone to urgent care or the emergency room because of your headaches?  0    Hypothyroidism Follow-up      Since last visit, patient describes the following symptoms: Weight stable, no hair loss, no skin changes, no constipation, no loose stools        Review of Systems   Constitutional, HEENT, cardiovascular, pulmonary, gi and gu systems are negative, except as otherwise noted.      Objective    /58 (BP Location: Right arm, Patient Position: Sitting)   Pulse 68   Resp 16   Ht 1.6 m (5' 3\")   Wt 60.8 kg (134 lb)   SpO2 99%   BMI 23.74 kg/m    Body mass index is 23.74 kg/m .  Physical Exam   GENERAL: healthy, alert and no distress  NECK: no adenopathy, no asymmetry, masses, or scars and thyroid normal to palpation  RESP: lungs clear to auscultation - no rales, rhonchi or " wheezes  CV: regular rate and rhythm, normal S1 S2, no S3 or S4, no murmur, click or rub, no peripheral edema and peripheral pulses strong  ABDOMEN: soft, nontender, no hepatosplenomegaly, no masses and bowel sounds normal  MS: no gross musculoskeletal defects noted, no edema

## 2022-03-24 NOTE — LETTER
March 25, 2022      Sabina Jara  [address] [facility] [address]        Dear ,    We are writing to inform you of your test results.    Your test results fall within the expected range(s) or remain unchanged from previous results.  Please continue with current treatment plan.    Resulted Orders   Ionized Calcium   Result Value Ref Range    Calcium Ionized 4.3 (L) 4.4 - 5.2 mg/dL   CBC with platelets   Result Value Ref Range    WBC Count 5.3 4.0 - 11.0 10e3/uL    RBC Count 4.87 3.80 - 5.20 10e6/uL    Hemoglobin 14.3 11.7 - 15.7 g/dL    Hematocrit 45.1 35.0 - 47.0 %    MCV 93 78 - 100 fL    MCH 29.4 26.5 - 33.0 pg    MCHC 31.7 31.5 - 36.5 g/dL    RDW 13.4 10.0 - 15.0 %    Platelet Count 292 150 - 450 10e3/uL   Comprehensive metabolic panel   Result Value Ref Range    Sodium 141 133 - 144 mmol/L    Potassium 3.9 3.4 - 5.3 mmol/L    Chloride 108 94 - 109 mmol/L    Carbon Dioxide (CO2) 26 20 - 32 mmol/L    Anion Gap 7 3 - 14 mmol/L    Urea Nitrogen 16 7 - 30 mg/dL    Creatinine 1.23 (H) 0.52 - 1.04 mg/dL    Calcium 8.4 (L) 8.5 - 10.1 mg/dL    Glucose 79 70 - 99 mg/dL    Alkaline Phosphatase 48 40 - 150 U/L    AST 13 0 - 45 U/L    ALT 20 0 - 50 U/L    Protein Total 7.6 6.8 - 8.8 g/dL    Albumin 4.2 3.4 - 5.0 g/dL    Bilirubin Total 0.4 0.2 - 1.3 mg/dL    GFR Estimate 56 (L) >60 mL/min/1.73m2      Comment:      Effective December 21, 2021 eGFRcr in adults is calculated using the 2021 CKD-EPI creatinine equation which includes age and gender (Aung et al., NEJ, DOI: 10.1056/YLFBxl8499986)   T4 free   Result Value Ref Range    Free T4 1.36 0.76 - 1.46 ng/dL   TSH   Result Value Ref Range    TSH 0.19 (L) 0.40 - 4.00 mU/L       If you have any questions or concerns, please call the clinic at the number listed above.       Sincerely,      Vladimir Mahajan MD

## 2022-03-25 ASSESSMENT — ANXIETY QUESTIONNAIRES: GAD7 TOTAL SCORE: 17

## 2022-03-25 ASSESSMENT — PATIENT HEALTH QUESTIONNAIRE - PHQ9: SUM OF ALL RESPONSES TO PHQ QUESTIONS 1-9: 17

## 2022-06-02 ENCOUNTER — TELEPHONE (OUTPATIENT)
Dept: FAMILY MEDICINE | Facility: CLINIC | Age: 42
End: 2022-06-02
Payer: COMMERCIAL

## 2022-06-02 ENCOUNTER — LAB (OUTPATIENT)
Dept: LAB | Facility: CLINIC | Age: 42
End: 2022-06-02
Payer: COMMERCIAL

## 2022-06-02 DIAGNOSIS — E83.51 HYPOCALCEMIA: ICD-10-CM

## 2022-06-02 DIAGNOSIS — E83.51 HYPOCALCEMIA: Primary | ICD-10-CM

## 2022-06-02 LAB — CALCIUM SERPL-MCNC: 6.9 MG/DL (ref 8.5–10.1)

## 2022-06-02 PROCEDURE — 82310 ASSAY OF CALCIUM: CPT

## 2022-06-02 PROCEDURE — 36415 COLL VENOUS BLD VENIPUNCTURE: CPT

## 2022-06-02 NOTE — TELEPHONE ENCOUNTER
Reason for Call: Request for an order or referral:    Order or referral being requested: Calcium Order     Date needed: as soon as possible    Has the patient been seen by the PCP for this problem? YES    Additional comments: Patient calling in states that she feels like her calcium is low she woke up with muscle craps and they are not going away     Phone number Patient can be reached at:  Home number on file [phone] (home)    Best Time:  any    Can we leave a detailed message on this number?  YES    Call taken on 6/2/2022 at 2:44 PM by Bev Jackson

## 2022-06-02 NOTE — TELEPHONE ENCOUNTER
Please let her know there is now a standing order so she can schedule a lab visit anytime she has concerns to check calcium level.

## 2022-06-02 NOTE — TELEPHONE ENCOUNTER
Last Calcium level on 3/24/22 was 8.4. No concern per Dr Mahajan on 3/25/22 result letter. Patient taking Rocaltrol 0.5mcg with SIG 2 caps po daily. Advise

## 2022-07-21 ENCOUNTER — OFFICE VISIT (OUTPATIENT)
Dept: FAMILY MEDICINE | Facility: CLINIC | Age: 42
End: 2022-07-21
Payer: COMMERCIAL

## 2022-07-21 VITALS
BODY MASS INDEX: 23.54 KG/M2 | WEIGHT: 132.9 LBS | DIASTOLIC BLOOD PRESSURE: 75 MMHG | OXYGEN SATURATION: 100 % | TEMPERATURE: 98.1 F | SYSTOLIC BLOOD PRESSURE: 122 MMHG | HEART RATE: 73 BPM

## 2022-07-21 DIAGNOSIS — G43.709 CHRONIC MIGRAINE WITHOUT AURA WITHOUT STATUS MIGRAINOSUS, NOT INTRACTABLE: Primary | ICD-10-CM

## 2022-07-21 PROCEDURE — 99213 OFFICE O/P EST LOW 20 MIN: CPT | Performed by: FAMILY MEDICINE

## 2022-07-21 ASSESSMENT — PATIENT HEALTH QUESTIONNAIRE - PHQ9
SUM OF ALL RESPONSES TO PHQ QUESTIONS 1-9: 8
SUM OF ALL RESPONSES TO PHQ QUESTIONS 1-9: 8
10. IF YOU CHECKED OFF ANY PROBLEMS, HOW DIFFICULT HAVE THESE PROBLEMS MADE IT FOR YOU TO DO YOUR WORK, TAKE CARE OF THINGS AT HOME, OR GET ALONG WITH OTHER PEOPLE: SOMEWHAT DIFFICULT

## 2022-07-21 NOTE — PROGRESS NOTES
"  Assessment & Plan     Chronic migraine without aura without status migrainosus, not intractable   FMLA paperwork filled out      F/U PRN     15 minutes spent on the date of the encounter doing chart review, history and exam, documentation and further activities per the note      Vladimir Mahajan MD  [facility]    Deyvi Muhammad is a 42 year old, presenting for the following health issues:  Headache (Needs FMLA forms updated)      History of Present Illness       Headaches:   Since the patient's last clinic visit, headaches are: worsened  The patient is getting headaches:  5 or 6 times a month  She is able to do normal daily activities when she has a migraine.  The patient is taking the following rescue/relief medications:  Sumatriptan (Imitrex)   Patient states \"I get total relief\" from the rescue/relief medications.   The patient is taking the following medications to prevent migraines:  Topomax  In the past 4 weeks, the patient has gone to an Urgent Care or Emergency Room 0 times times due to headaches.    She eats 0-1 servings of fruits and vegetables daily.She consumes 0 sweetened beverage(s) daily.She exercises with enough effort to increase her heart rate 30 to 60 minutes per day.  She exercises with enough effort to increase her heart rate 5 days per week. She is missing 1 dose(s) of medications per week.  She is not taking prescribed medications regularly due to other.    Today's PHQ-9         PHQ-9 Total Score: 8    PHQ-9 Q9 Thoughts of better off dead/self-harm past 2 weeks :   Not at all    How difficult have these problems made it for you to do your work, take care of things at home, or get along with other people: Somewhat difficult       Migraine     Since your last clinic visit, how have your headaches changed?  Worsened    How often are you getting headaches or migraines? 6/month     Are you able to do normal daily activities when you have a migraine? No    Are " you taking rescue/relief medications? (Select all that apply) sumatriptan (Imitrex)    How helpful is your rescue/relief medication?  I get some relief    Are you taking any medications to prevent migraines? (Select all that apply)  Topamax    In the past 4 weeks, how often have you gone to urgent care or the emergency room because of your headaches?  0        Review of Systems   Constitutional, HEENT, cardiovascular, pulmonary, gi and gu systems are negative, except as otherwise noted.      Objective    /75 (BP Location: Right arm)   Pulse 73   Temp 98.1  F (36.7  C)   Wt 60.3 kg (132 lb 14.4 oz)   SpO2 100%   BMI 23.54 kg/m    Body mass index is 23.54 kg/m .  Physical Exam   GENERAL: healthy, alert and no distress  NECK: no adenopathy, no asymmetry, masses, or scars and thyroid normal to palpation  RESP: lungs clear to auscultation - no rales, rhonchi or wheezes  CV: regular rate and rhythm, normal S1 S2, no S3 or S4, no murmur, click or rub, no peripheral edema and peripheral pulses strong  ABDOMEN: soft, nontender, no hepatosplenomegaly, no masses and bowel sounds normal  Neuro No focal signs    MS: no gross musculoskeletal defects noted, no edema                    .  ..

## 2022-07-24 ENCOUNTER — HEALTH MAINTENANCE LETTER (OUTPATIENT)
Age: 42
End: 2022-07-24

## 2022-10-03 ENCOUNTER — HEALTH MAINTENANCE LETTER (OUTPATIENT)
Age: 42
End: 2022-10-03

## 2023-02-01 ENCOUNTER — OFFICE VISIT (OUTPATIENT)
Dept: FAMILY MEDICINE | Facility: CLINIC | Age: 43
End: 2023-02-01
Payer: COMMERCIAL

## 2023-02-01 VITALS
RESPIRATION RATE: 16 BRPM | BODY MASS INDEX: 25.34 KG/M2 | DIASTOLIC BLOOD PRESSURE: 80 MMHG | TEMPERATURE: 97.1 F | SYSTOLIC BLOOD PRESSURE: 102 MMHG | WEIGHT: 143 LBS | HEIGHT: 63 IN | HEART RATE: 72 BPM

## 2023-02-01 DIAGNOSIS — B00.9 HSV-1 (HERPES SIMPLEX VIRUS 1) INFECTION: Primary | ICD-10-CM

## 2023-02-01 PROCEDURE — 99213 OFFICE O/P EST LOW 20 MIN: CPT | Performed by: PHYSICIAN ASSISTANT

## 2023-02-01 RX ORDER — FLUCONAZOLE 150 MG/1
1 TABLET ORAL
COMMUNITY
Start: 2022-10-01 | End: 2023-02-01

## 2023-02-01 RX ORDER — VALACYCLOVIR HYDROCHLORIDE 500 MG/1
500 TABLET, FILM COATED ORAL 2 TIMES DAILY
Qty: 30 TABLET | Refills: 1 | Status: SHIPPED | OUTPATIENT
Start: 2023-02-01

## 2023-02-01 NOTE — PROGRESS NOTES
"Assessment & Plan     HSV-1 (herpes simplex virus 1) infection  Location and sx/exam are consistent with prior location swabbed positive for HSV1.  Refilled valtrex.  Follow-up for persistent or worsening sx.    - valACYclovir (VALTREX) 500 MG tablet  Dispense: 30 tablet; Refill: 1    FAIZAN Durbin [facility] JAXON Muhammad is a 42 year old female who presents to clinic today for the following health issues:  Chief Complaint   Patient presents with     Vaginal Problem     Pt c/o painful vaginal lesion x 4 days     Vaginal Problem     History of Present Illness       Reason for visit:  Lesion eval    She eats 0-1 servings of fruits and vegetables daily.She consumes 0 sweetened beverage(s) daily.She exercises with enough effort to increase her heart rate 30 to 60 minutes per day.  She exercises with enough effort to increase her heart rate 5 days per week.   She is taking medications regularly.  known HSV 1, 2 years ago, has not had recurrence.  Over the last 4 days developed irritation externally, same location. No discharge change.  No dysuria, frequency, urgency. No hematuria.       Review of Systems   Genitourinary: Positive for vaginal discharge.       Objective    /80 (BP Location: Right arm, Patient Position: Sitting, Cuff Size: Adult Regular)   Pulse 72   Temp 97.1  F (36.2  C) (Tympanic)   Resp 16   Ht 1.6 m (5' 3\")   Wt 64.9 kg (143 lb)   BMI 25.33 kg/m    Physical Exam    exam reveals EG that of normal female, small shallow open ulceration at the R lower labia minora just lateral to the introitus.            "

## 2023-02-04 DIAGNOSIS — E03.9 ACQUIRED HYPOTHYROIDISM: ICD-10-CM

## 2023-02-06 RX ORDER — LEVOTHYROXINE SODIUM 150 UG/1
TABLET ORAL
Qty: 30 TABLET | Refills: 0 | Status: SHIPPED | OUTPATIENT
Start: 2023-02-06 | End: 2023-03-07

## 2023-02-06 NOTE — TELEPHONE ENCOUNTER
Prescription approved per [facility] Refill Protocol.  Steph refill due for yearly preventative 03/2023  Last Written Prescription Date: 3/24/22  Last Fill Quantity: 90,  # refills: 3  Last office visit: 2/1/2023 with prescribing provider

## 2023-03-04 DIAGNOSIS — E03.9 ACQUIRED HYPOTHYROIDISM: ICD-10-CM

## 2023-03-07 RX ORDER — LEVOTHYROXINE SODIUM 150 UG/1
TABLET ORAL
Qty: 30 TABLET | Refills: 0 | Status: SHIPPED | OUTPATIENT
Start: 2023-03-07 | End: 2023-04-21

## 2023-03-07 NOTE — TELEPHONE ENCOUNTER
"      Last office visit: 2/1/2023 Acute      Future Appointments 3/7/2023 - 9/3/2023    None          Requested Prescriptions   Pending Prescriptions Disp Refills     levothyroxine (SYNTHROID/LEVOTHROID) 150 MCG tablet [Pharmacy Med Name: LEVOTHYROXINE 0.150MG (150MCG) TAB] 30 tablet 0     Sig: TAKE 1 TABLET(150 MCG) BY MOUTH DAILY       Thyroid Protocol Failed - 3/4/2023  8:00 AM        Failed - Normal TSH on file in past 12 months     Recent Labs   Lab Test 03/24/22  1242   TSH 0.19*              Passed - Patient is 12 years or older        Passed - Recent (12 mo) or future (30 days) visit within the authorizing provider's specialty     Patient has had an office visit with the authorizing provider or a provider within the authorizing providers department within the previous 12 mos or has a future within next 30 days. See \"Patient Info\" tab in inbasket, or \"Choose Columns\" in Meds & Orders section of the refill encounter.              Passed - Medication is active on med list        Passed - No active pregnancy on record     If patient is pregnant or has had a positive pregnancy test, please check TSH.          Passed - No positive pregnancy test in past 12 months     If patient is pregnant or has had a positive pregnancy test, please check TSH.                     "

## 2023-03-24 DIAGNOSIS — G43.009 MIGRAINE WITHOUT AURA AND WITHOUT STATUS MIGRAINOSUS, NOT INTRACTABLE: ICD-10-CM

## 2023-03-24 RX ORDER — SUMATRIPTAN 100 MG/1
TABLET, FILM COATED ORAL
Qty: 15 TABLET | Refills: 11 | Status: SHIPPED | OUTPATIENT
Start: 2023-03-24 | End: 2023-05-24

## 2023-04-19 DIAGNOSIS — E03.9 ACQUIRED HYPOTHYROIDISM: ICD-10-CM

## 2023-04-21 DIAGNOSIS — E03.9 ACQUIRED HYPOTHYROIDISM: ICD-10-CM

## 2023-04-21 RX ORDER — LEVOTHYROXINE SODIUM 150 UG/1
TABLET ORAL
Qty: 30 TABLET | Refills: 0 | Status: SHIPPED | OUTPATIENT
Start: 2023-04-21 | End: 2023-05-19

## 2023-04-21 NOTE — TELEPHONE ENCOUNTER
Routing refill request to provider for review/approval because:  Steph given x1 and patient did not follow up, please advise    My Chart message was sent to patient regarding scheduling an appointment.      Last Written Prescription Date:  3/7/2023  Last Fill Quantity: 30,  # refills:0   Last office visit: 2/1/2023

## 2023-04-25 RX ORDER — LEVOTHYROXINE SODIUM 150 UG/1
TABLET ORAL
Qty: 90 TABLET | Refills: 0 | OUTPATIENT
Start: 2023-04-25

## 2023-04-25 NOTE — TELEPHONE ENCOUNTER
This refill request is a duplicate request, previously received or sent.  Sent denial notification to pharmacy.  Maria Luisa KEATING RN  [facility]

## 2023-05-19 ENCOUNTER — MYC MEDICAL ADVICE (OUTPATIENT)
Dept: FAMILY MEDICINE | Facility: CLINIC | Age: 43
End: 2023-05-19
Payer: COMMERCIAL

## 2023-05-19 DIAGNOSIS — E03.9 ACQUIRED HYPOTHYROIDISM: ICD-10-CM

## 2023-05-19 RX ORDER — LEVOTHYROXINE SODIUM 150 UG/1
TABLET ORAL
Qty: 90 TABLET | OUTPATIENT
Start: 2023-05-19

## 2023-05-19 RX ORDER — LEVOTHYROXINE SODIUM 150 UG/1
TABLET ORAL
Qty: 30 TABLET | Refills: 0 | Status: SHIPPED | OUTPATIENT
Start: 2023-05-19 | End: 2023-06-20

## 2023-05-19 NOTE — TELEPHONE ENCOUNTER
"Routing refill request to provider for review/approval because:  Labs not current:  Last TSH 3/24/22.  Over 12 months    Last Written Prescription Date:4/21/2023  Last Fill Quantity: 30 tabs  # refills: 0  Last office visit provider:  2/1/23      Requested Prescriptions   Pending Prescriptions Disp Refills     levothyroxine (SYNTHROID/LEVOTHROID) 150 MCG tablet [Pharmacy Med Name: LEVOTHYROXINE 0.150MG (150MCG) TAB] 30 tablet 0     Sig: TAKE 1 TABLET(150 MCG) BY MOUTH DAILY       Thyroid Protocol Failed - 5/19/2023  2:01 PM        Failed - Normal TSH on file in past 12 months     Recent Labs   Lab Test 03/24/22  1242   TSH 0.19*              Passed - Patient is 12 years or older        Passed - Recent (12 mo) or future (30 days) visit within the authorizing provider's specialty     Patient has had an office visit with the authorizing provider or a provider within the authorizing providers department within the previous 12 mos or has a future within next 30 days. See \"Patient Info\" tab in inbasket, or \"Choose Columns\" in Meds & Orders section of the refill encounter.              Passed - Medication is active on med list        Passed - No active pregnancy on record     If patient is pregnant or has had a positive pregnancy test, please check TSH.          Passed - No positive pregnancy test in past 12 months     If patient is pregnant or has had a positive pregnancy test, please check TSH.               Amelia Cooper RN 05/19/23 2:13 PM  "

## 2023-05-24 ENCOUNTER — OFFICE VISIT (OUTPATIENT)
Dept: FAMILY MEDICINE | Facility: CLINIC | Age: 43
End: 2023-05-24
Payer: COMMERCIAL

## 2023-05-24 VITALS
WEIGHT: 135.8 LBS | DIASTOLIC BLOOD PRESSURE: 68 MMHG | BODY MASS INDEX: 24.06 KG/M2 | HEIGHT: 63 IN | OXYGEN SATURATION: 99 % | HEART RATE: 71 BPM | TEMPERATURE: 97.9 F | SYSTOLIC BLOOD PRESSURE: 106 MMHG

## 2023-05-24 DIAGNOSIS — E83.51 HYPOCALCEMIA: ICD-10-CM

## 2023-05-24 DIAGNOSIS — G43.009 MIGRAINE WITHOUT AURA AND WITHOUT STATUS MIGRAINOSUS, NOT INTRACTABLE: ICD-10-CM

## 2023-05-24 DIAGNOSIS — Z00.00 ANNUAL PHYSICAL EXAM: Primary | ICD-10-CM

## 2023-05-24 DIAGNOSIS — E03.9 ACQUIRED HYPOTHYROIDISM: ICD-10-CM

## 2023-05-24 LAB
ANION GAP SERPL CALCULATED.3IONS-SCNC: 12 MMOL/L (ref 7–15)
BUN SERPL-MCNC: 12.2 MG/DL (ref 6–20)
CALCIUM SERPL-MCNC: 8.7 MG/DL (ref 8.6–10)
CHLORIDE SERPL-SCNC: 105 MMOL/L (ref 98–107)
CREAT SERPL-MCNC: 1.16 MG/DL (ref 0.51–0.95)
DEPRECATED HCO3 PLAS-SCNC: 24 MMOL/L (ref 22–29)
GFR SERPL CREATININE-BSD FRML MDRD: 60 ML/MIN/1.73M2
GLUCOSE SERPL-MCNC: 91 MG/DL (ref 70–99)
POTASSIUM SERPL-SCNC: 4.6 MMOL/L (ref 3.4–5.3)
SODIUM SERPL-SCNC: 141 MMOL/L (ref 136–145)
TSH SERPL DL<=0.005 MIU/L-ACNC: 0.04 UIU/ML (ref 0.3–4.2)

## 2023-05-24 PROCEDURE — 84443 ASSAY THYROID STIM HORMONE: CPT | Performed by: NURSE PRACTITIONER

## 2023-05-24 PROCEDURE — 99396 PREV VISIT EST AGE 40-64: CPT | Performed by: NURSE PRACTITIONER

## 2023-05-24 PROCEDURE — 84439 ASSAY OF FREE THYROXINE: CPT | Performed by: NURSE PRACTITIONER

## 2023-05-24 PROCEDURE — 80048 BASIC METABOLIC PNL TOTAL CA: CPT | Performed by: NURSE PRACTITIONER

## 2023-05-24 PROCEDURE — 36415 COLL VENOUS BLD VENIPUNCTURE: CPT | Performed by: NURSE PRACTITIONER

## 2023-05-24 PROCEDURE — 99213 OFFICE O/P EST LOW 20 MIN: CPT | Mod: 25 | Performed by: NURSE PRACTITIONER

## 2023-05-24 RX ORDER — SUMATRIPTAN 100 MG/1
TABLET, FILM COATED ORAL
Qty: 15 TABLET | Refills: 11 | Status: SHIPPED | OUTPATIENT
Start: 2023-05-24 | End: 2024-07-09

## 2023-05-24 RX ORDER — SUMATRIPTAN 6 MG/.5ML
6 INJECTION, SOLUTION SUBCUTANEOUS ONCE
Qty: 0.5 ML | Refills: 0 | Status: SHIPPED | OUTPATIENT
Start: 2023-05-24 | End: 2023-05-24

## 2023-05-24 RX ORDER — CALCITRIOL 0.5 UG/1
1 CAPSULE, LIQUID FILLED ORAL DAILY
Qty: 180 CAPSULE | Refills: 3 | Status: CANCELLED | OUTPATIENT
Start: 2023-05-24 | End: 2023-08-22

## 2023-05-24 ASSESSMENT — ENCOUNTER SYMPTOMS
MYALGIAS: 1
FEVER: 0
BREAST MASS: 0
JOINT SWELLING: 0
FREQUENCY: 0
PARESTHESIAS: 0
ARTHRALGIAS: 0
COUGH: 0
HEADACHES: 1
DYSURIA: 0
DIZZINESS: 0
SORE THROAT: 0
HEMATOCHEZIA: 0
WEAKNESS: 0
SHORTNESS OF BREATH: 0
PALPITATIONS: 0
HEMATURIA: 0
NERVOUS/ANXIOUS: 1
HEARTBURN: 0
DIARRHEA: 0
EYE PAIN: 1
CONSTIPATION: 0
NAUSEA: 0
CHILLS: 1
ABDOMINAL PAIN: 0

## 2023-05-24 ASSESSMENT — ANXIETY QUESTIONNAIRES
7. FEELING AFRAID AS IF SOMETHING AWFUL MIGHT HAPPEN: NOT AT ALL
6. BECOMING EASILY ANNOYED OR IRRITABLE: NEARLY EVERY DAY
3. WORRYING TOO MUCH ABOUT DIFFERENT THINGS: NEARLY EVERY DAY
1. FEELING NERVOUS, ANXIOUS, OR ON EDGE: NEARLY EVERY DAY
4. TROUBLE RELAXING: MORE THAN HALF THE DAYS
GAD7 TOTAL SCORE: 14
5. BEING SO RESTLESS THAT IT IS HARD TO SIT STILL: NOT AT ALL
8. IF YOU CHECKED OFF ANY PROBLEMS, HOW DIFFICULT HAVE THESE MADE IT FOR YOU TO DO YOUR WORK, TAKE CARE OF THINGS AT HOME, OR GET ALONG WITH OTHER PEOPLE?: SOMEWHAT DIFFICULT
2. NOT BEING ABLE TO STOP OR CONTROL WORRYING: NEARLY EVERY DAY
7. FEELING AFRAID AS IF SOMETHING AWFUL MIGHT HAPPEN: NOT AT ALL
IF YOU CHECKED OFF ANY PROBLEMS ON THIS QUESTIONNAIRE, HOW DIFFICULT HAVE THESE PROBLEMS MADE IT FOR YOU TO DO YOUR WORK, TAKE CARE OF THINGS AT HOME, OR GET ALONG WITH OTHER PEOPLE: SOMEWHAT DIFFICULT

## 2023-05-24 ASSESSMENT — PATIENT HEALTH QUESTIONNAIRE - PHQ9
SUM OF ALL RESPONSES TO PHQ QUESTIONS 1-9: 10
SUM OF ALL RESPONSES TO PHQ QUESTIONS 1-9: 10
10. IF YOU CHECKED OFF ANY PROBLEMS, HOW DIFFICULT HAVE THESE PROBLEMS MADE IT FOR YOU TO DO YOUR WORK, TAKE CARE OF THINGS AT HOME, OR GET ALONG WITH OTHER PEOPLE: SOMEWHAT DIFFICULT

## 2023-05-24 NOTE — PROGRESS NOTES
SUBJECTIVE:   CC: Sabina is an 43 year old who presents for preventive health visit.       5/24/2023     2:44 PM   Additional Questions   Roomed by sue montiel   Accompanied by self   Patient has been advised of split billing requirements and indicates understanding: Yes   She is here for annual exam.  She would like me to do some med refills    She is on calcium medication for low calcium, she recently needed an infusion after she donated blood.  She is followed with Dr. Harris of nephrology.  Has not had her level checked for a year.  She also follows with Dr. Mahajan routinely and I asked her to follow-up with either 1 of those for any refills of her calcium medication but I am willing to check her lab work today.    She is also on thyroid medication and due for labs.  States she has been stable on this dose for many years    Does not need cervical cancer screening as she had a hysterectomy with ovaries preserved hoping it would help with her migraines but it really did not    She works as a nurse and had recent HIV and hep C testing so declines that    She has had migraines for a long time.  She requests Imitrex to be refilled at both injectable and a pill.  If the pill does not work 2 hours later she takes injectable.  She wants me to know about significant headaches that she had recently and we spent time talking about that today  She has HA 5 days in a row  behind the right eye and forehead  Two weeks prior she lost peripheral vision problems, describes an auro, happened twice in the last 2 weeks, started imitrex and aura lasted  An hour.  She has never had an aura in the past.  Had some nausea but no vomiting.  Had frontal headache but the pain persisted behind the right ear she ended up in the ER in [place] and they found inflamed sinuses but nothing that was worthy of treating.  After sharing more information with me about her headaches I offered to start her on a preventative such as propranolol.  She  informed me that she tried that in the past but it causes low blood pressure.  I asked if there were other medications she tried and then she shared with me that she started topiramate but it was not very helpful and affected the kidneys and so Dr. Mahajan referred her to Dr. Harris to further evaluate that.  In the end I offered if she wanted to see neurology because she had consistent concerns about this new type of headache but she is going to schedule with Dr. Mahajan again to readdress    Has never had a mammogram and has a paternal grandmother with bilateral breast cancer.  I recommended she schedule a mammogram    There is no colon cancer in the family      Healthy Habits:     Getting at least 3 servings of Calcium per day:  Yes    Bi-annual eye exam:  Yes    Dental care twice a year:  Yes    Sleep apnea or symptoms of sleep apnea:  None    Diet:  Regular (no restrictions)    Frequency of exercise:  4-5 days/week    Duration of exercise:  45-60 minutes    Taking medications regularly:  Yes    Medication side effects:  None    PHQ-2 Total Score: 1    Additional concerns today:  Yes  Anxiety  Associated symptoms include chills, headaches and myalgias. Pertinent negatives include no abdominal pain, arthralgias, chest pain, congestion, coughing, fever, joint swelling, nausea, rash, sore throat or weakness.         Have you ever done Advance Care Planning? (For example, a Health Directive, POLST, or a discussion with a medical provider or your loved ones about your wishes): No, advance care planning information given to patient to review.  Advanced care planning was discussed at today's visit.    Social History     Tobacco Use     Smoking status: Every Day     Packs/day: 0.50     Years: 7.00     Pack years: 3.50     Types: Cigarettes     Smokeless tobacco: Never     Tobacco comments:     less than 1/2 pack daily   Vaping Use     Vaping status: Every Day   Substance Use Topics     Alcohol use: Yes     Comment:  "\"rare\"     She is currently smoking about 5 cigarettes/day.  Switched over to vaping and was counseled that she needs to wean off the vaping as well            5/24/2023     2:40 PM   Alcohol Use   Prescreen: >3 drinks/day or >7 drinks/week? No   Reviewed orders with patient.  Reviewed health maintenance and updated orders accordingly - Yes  Lab work is in process    Breast Cancer Screening:    FHS-7:       5/24/2023     2:41 PM   Breast CA Risk Assessment (FHS-7)   Did any of your first-degree relatives have breast or ovarian cancer? No   Did any of your relatives have bilateral breast cancer? Yes   Did any man in your family have breast cancer? No   Did any woman in your family have breast and ovarian cancer? No   Did any woman in your family have breast cancer before age 50 y? No   Do you have 2 or more relatives with breast and/or ovarian cancer? No   Do you have 2 or more relatives with breast and/or bowel cancer? No       Mammogram Screening - Offered annual screening and updated Health Maintenance for mutual plan based on risk factor consideration    Pertinent mammograms are reviewed under the imaging tab.    History of abnormal Pap smear: Status post benign hysterectomy. Health Maintenance and Surgical History updated.      8/5/2004    11:25 AM   PAP / HPV   PAP (Historical) WNL       Reviewed and updated as needed this visit by clinical staff   Tobacco  Allergies  Meds              Reviewed and updated as needed this visit by Provider                     Review of Systems   Constitutional: Positive for chills. Negative for fever.   HENT: Negative for congestion, ear pain, hearing loss and sore throat.    Eyes: Positive for pain and visual disturbance.   Respiratory: Negative for cough and shortness of breath.    Cardiovascular: Negative for chest pain, palpitations and peripheral edema.   Gastrointestinal: Negative for abdominal pain, constipation, diarrhea, heartburn, hematochezia and nausea.   Breasts: " " Negative for tenderness, breast mass and discharge.   Genitourinary: Negative for dysuria, frequency, genital sores, hematuria, pelvic pain, urgency, vaginal bleeding and vaginal discharge.   Musculoskeletal: Positive for myalgias. Negative for arthralgias and joint swelling.   Skin: Negative for rash.   Neurological: Positive for headaches. Negative for dizziness, weakness and paresthesias.   Psychiatric/Behavioral: Negative for mood changes. The patient is nervous/anxious.           OBJECTIVE:   /68 (BP Location: Right arm, Patient Position: Sitting)   Pulse 71   Temp 97.9  F (36.6  C)   Ht 1.588 m (5' 2.5\")   Wt 61.6 kg (135 lb 12.8 oz)   LMP  (LMP Unknown)   SpO2 99%   BMI 24.44 kg/m    Physical Exam  GENERAL: healthy, alert and no distress  EYES: Eyes grossly normal to inspection, PERRL and conjunctivae and sclerae normal  HENT: ear canals and TM's normal, nose and mouth without ulcers or lesions  NECK: no adenopathy, no asymmetry, masses, or scars and thyroid normal to palpation  RESP: lungs clear to auscultation - no rales, rhonchi or wheezes  BREAST: normal without masses, tenderness or nipple discharge and no palpable axillary masses or adenopathy  CV: regular rate and rhythm, normal S1 S2, no S3 or S4, no murmur, click or rub, no peripheral edema and peripheral pulses strong  ABDOMEN: soft, nontender, no hepatosplenomegaly, no masses and bowel sounds normal  MS: no gross musculoskeletal defects noted, no edema  SKIN: no suspicious lesions or rashes  NEURO: Normal strength and tone, mentation intact and speech normal  PSYCH: mentation appears normal, affect normal/bright  Breast implants in place        ASSESSMENT/PLAN:       ICD-10-CM    1. Annual physical exam  Z00.00 REVIEW OF HEALTH MAINTENANCE PROTOCOL ORDERS      2. Acquired hypothyroidism  E03.9 TSH with free T4 reflex     TSH with free T4 reflex      3. Hypocalcemia  E83.51 Basic metabolic panel     Basic metabolic panel      4. " "Migraine without aura and without status migrainosus, not intractable  G43.009 SUMAtriptan (IMITREX) 6 MG/0.5ML injection     SUMAtriptan (IMITREX) 100 MG tablet          Patient has been advised of split billing requirements and indicates understanding: Yes      COUNSELING:  Reviewed preventive health counseling, as reflected in patient instructions       Regular exercise       Healthy diet/nutrition       Vision screening       Colorectal Cancer Screening       Consider Hep C screening for all patients one time for ages 18-79 years       HIV screeninx in teen years, 1x in adult years, and at intervals if high risk      BMI:   Estimated body mass index is 24.44 kg/m  as calculated from the following:    Height as of this encounter: 1.588 m (5' 2.5\").    Weight as of this encounter: 61.6 kg (135 lb 12.8 oz).         She reports that she has been smoking cigarettes. She has a 3.50 pack-year smoking history. She has never used smokeless tobacco.  Nicotine/Tobacco Cessation Plan:   Information offered: Patient not interested at this time          Lavonne Meneses NP  [facility]  Answers for HPI/ROS submitted by the patient on 2023  If you checked off any problems, how difficult have these problems made it for you to do your work, take care of things at home, or get along with other people?: Somewhat difficult  PHQ9 TOTAL SCORE: 10  GILBERTO 7 TOTAL SCORE: 14      "

## 2023-05-25 LAB — T4 FREE SERPL-MCNC: 1.65 NG/DL (ref 0.9–1.7)

## 2023-05-26 RX ORDER — LEVOTHYROXINE SODIUM 137 UG/1
137 TABLET ORAL DAILY
Qty: 90 TABLET | Refills: 0 | Status: SHIPPED | OUTPATIENT
Start: 2023-05-26 | End: 2023-06-30

## 2023-06-20 DIAGNOSIS — E03.9 ACQUIRED HYPOTHYROIDISM: ICD-10-CM

## 2023-06-20 RX ORDER — LEVOTHYROXINE SODIUM 150 UG/1
TABLET ORAL
Qty: 30 TABLET | Refills: 0 | Status: SHIPPED | OUTPATIENT
Start: 2023-06-20 | End: 2023-07-28

## 2023-06-20 NOTE — TELEPHONE ENCOUNTER
"    Last office visit: 5/24/2023   She is also on thyroid medication and due for labs.  States she has been stable on this dose for many years    Future Appointments 6/20/2023 - 12/17/2023    None        Lab Results   Component Value Date    TSH 0.04 05/24/2023    TSH 0.19 03/24/2022    TSH 0.16 09/08/2004         Requested Prescriptions   Pending Prescriptions Disp Refills     levothyroxine (SYNTHROID/LEVOTHROID) 150 MCG tablet [Pharmacy Med Name: LEVOTHYROXINE 0.150MG (150MCG) TAB] 30 tablet 0     Sig: TAKE 1 TABLET(150 MCG) BY MOUTH DAILY       Thyroid Protocol Failed - 6/20/2023  3:14 PM        Failed - Normal TSH on file in past 12 months     Recent Labs   Lab Test 05/24/23  1555   TSH 0.04*              Passed - Patient is 12 years or older        Passed - Recent (12 mo) or future (30 days) visit within the authorizing provider's specialty     Patient has had an office visit with the authorizing provider or a provider within the authorizing providers department within the previous 12 mos or has a future within next 30 days. See \"Patient Info\" tab in inbasket, or \"Choose Columns\" in Meds & Orders section of the refill encounter.              Passed - Medication is active on med list        Passed - No active pregnancy on record     If patient is pregnant or has had a positive pregnancy test, please check TSH.          Passed - No positive pregnancy test in past 12 months     If patient is pregnant or has had a positive pregnancy test, please check TSH.                     "

## 2023-06-21 RX ORDER — LEVOTHYROXINE SODIUM 150 UG/1
TABLET ORAL
Qty: 90 TABLET | OUTPATIENT
Start: 2023-06-21

## 2023-06-21 NOTE — TELEPHONE ENCOUNTER
This refill request is a duplicate request, previously received or sent.   Sent denial notification to pharmacy.  NEAL Bennett  [facility]

## 2023-07-27 ENCOUNTER — LAB (OUTPATIENT)
Dept: LAB | Facility: CLINIC | Age: 43
End: 2023-07-27
Payer: COMMERCIAL

## 2023-07-27 ENCOUNTER — DOCUMENTATION ONLY (OUTPATIENT)
Dept: LAB | Facility: CLINIC | Age: 43
End: 2023-07-27

## 2023-07-27 DIAGNOSIS — E03.9 ACQUIRED HYPOTHYROIDISM: ICD-10-CM

## 2023-07-27 DIAGNOSIS — Z11.59 NEED FOR HEPATITIS C SCREENING TEST: ICD-10-CM

## 2023-07-27 DIAGNOSIS — Z11.4 SCREENING FOR HIV (HUMAN IMMUNODEFICIENCY VIRUS): Primary | ICD-10-CM

## 2023-07-27 DIAGNOSIS — E83.51 HYPOCALCEMIA: Primary | ICD-10-CM

## 2023-07-27 LAB
CALCIUM SERPL-MCNC: 6.2 MG/DL (ref 8.6–10)
T4 FREE SERPL-MCNC: 1.74 NG/DL (ref 0.9–1.7)
TSH SERPL DL<=0.005 MIU/L-ACNC: 0.28 UIU/ML (ref 0.3–4.2)

## 2023-07-27 PROCEDURE — 84439 ASSAY OF FREE THYROXINE: CPT

## 2023-07-27 PROCEDURE — 36415 COLL VENOUS BLD VENIPUNCTURE: CPT

## 2023-07-27 PROCEDURE — 87389 HIV-1 AG W/HIV-1&-2 AB AG IA: CPT

## 2023-07-27 PROCEDURE — 86803 HEPATITIS C AB TEST: CPT

## 2023-07-27 PROCEDURE — 82310 ASSAY OF CALCIUM: CPT | Performed by: NURSE PRACTITIONER

## 2023-07-27 PROCEDURE — 84443 ASSAY THYROID STIM HORMONE: CPT

## 2023-07-27 NOTE — PROGRESS NOTES
Sabina was drawn today 7/27/23 for a TSH, she is also requesting a calcium level. If appropriate please order an add-on test.   [employer]

## 2023-07-28 ENCOUNTER — VIRTUAL VISIT (OUTPATIENT)
Dept: FAMILY MEDICINE | Facility: CLINIC | Age: 43
End: 2023-07-28
Payer: COMMERCIAL

## 2023-07-28 DIAGNOSIS — E03.9 ACQUIRED HYPOTHYROIDISM: ICD-10-CM

## 2023-07-28 DIAGNOSIS — E83.51 HYPOCALCEMIA: ICD-10-CM

## 2023-07-28 DIAGNOSIS — E03.9 ACQUIRED HYPOTHYROIDISM: Primary | ICD-10-CM

## 2023-07-28 LAB
HCV AB SERPL QL IA: NONREACTIVE
HIV 1+2 AB+HIV1 P24 AG SERPL QL IA: NONREACTIVE

## 2023-07-28 PROCEDURE — 99213 OFFICE O/P EST LOW 20 MIN: CPT | Mod: VID | Performed by: NURSE PRACTITIONER

## 2023-07-28 RX ORDER — CALCITRIOL 0.5 UG/1
CAPSULE, LIQUID FILLED ORAL
Qty: 270 CAPSULE | Refills: 3 | Status: SHIPPED | OUTPATIENT
Start: 2023-07-28

## 2023-07-28 RX ORDER — LEVOTHYROXINE SODIUM 125 UG/1
125 TABLET ORAL DAILY
Qty: 30 TABLET | Refills: 1 | Status: SHIPPED | OUTPATIENT
Start: 2023-07-28 | End: 2023-07-29

## 2023-07-28 NOTE — TELEPHONE ENCOUNTER
"Routing refill request to provider for review/approval because:  Labs out of range:  TSH    Last Written Prescription Date:  7/28/23  Last Fill Quantity: 30,  # refills: 1   Last office visit provider:   ALBERTO keating    Requested Prescriptions   Pending Prescriptions Disp Refills    levothyroxine (SYNTHROID/LEVOTHROID) 125 MCG tablet [Pharmacy Med Name: LEVOTHYROXINE 0.125MG (125MCG) TAB] 90 tablet      Sig: TAKE 1 TABLET(125 MCG) BY MOUTH DAILY       Thyroid Protocol Failed - 7/28/2023  4:42 PM        Failed - Normal TSH on file in past 12 months     Recent Labs   Lab Test 07/27/23  1432   TSH 0.28*              Passed - Patient is 12 years or older        Passed - Recent (12 mo) or future (30 days) visit within the authorizing provider's specialty     Patient has had an office visit with the authorizing provider or a provider within the authorizing providers department within the previous 12 mos or has a future within next 30 days. See \"Patient Info\" tab in inbasket, or \"Choose Columns\" in Meds & Orders section of the refill encounter.              Passed - Medication is active on med list        Passed - No active pregnancy on record     If patient is pregnant or has had a positive pregnancy test, please check TSH.          Passed - No positive pregnancy test in past 12 months     If patient is pregnant or has had a positive pregnancy test, please check TSH.               SAMEER TOLENTINO RN 07/28/23 4:42 PM  "

## 2023-07-28 NOTE — PROGRESS NOTES
Sabina is a 43 year old who is being evaluated via a billable video visit.      How would you like to obtain your AVS? MyChart  If the video visit is dropped, the invitation should be resent by: Text to cell phone: [phone]  Will anyone else be joining your video visit? No          Assessment & Plan     (E03.9) Acquired hypothyroidism  (primary encounter diagnosis)  Comment: We will reduce her levothyroxine in an effort to get her level closer to mid range  Plan: levothyroxine (SYNTHROID/LEVOTHROID) 125 MCG         tablet, TSH with free T4 reflex            (E83.51) Hypocalcemia  Comment: She is having some symptoms related to low calcium, this is not new for her.  She is requesting to have the option to increase her calcitriol periodically depending on her symptoms and prescription was changed.  I offered her to reconnect with endocrinology but at this time she declines  Plan: calcitRIOL (ROCALTROL) 0.5 MCG capsule,         **Creatinine FUTURE 2mos, Calcium                         Lavonne Meneses NP  [facility]    Deyvi Muhammad is a 43 year old, presenting for the following health issues: patient would like to discuss some recent lab results, had elevated Calcium and low TSH level.  She has a 20+ year history of hypothyroidism and hypocalcemia.  She used to follow with endocrinology but not at this time.  Her calcium is a little low with this recent lab although she typically runs low more often than she runs normal.  Been adjusting her thyroid as well as she was overmedicated.  She states that she feels pretty good today but over the last couple days she felt some numbness in the arms and legs and thighs.  Sometimes she gets cramps in her lower legs.  These are symptoms she commonly gets when her calcium is a little low.  She uses this is the large calcium supplement of 1500 mg daily and vitamin D for absorption.  In the past she has received IV infusions of calcium.  She is not  sure if she really needs at this time around.  Results        7/28/2023     2:43 PM   Additional Questions   Roomed by sue montiel   Accompanied by self       History of Present Illness       Hypothyroidism:     Since last visit, patient describes the following symptoms::  Weight gain    Weight gain::  5 lbs.    She eats 0-1 servings of fruits and vegetables daily.She consumes 0 sweetened beverage(s) daily.She exercises with enough effort to increase her heart rate 30 to 60 minutes per day.  She exercises with enough effort to increase her heart rate 4 days per week.   She is taking medications regularly.             Review of Systems         Objective           Vitals:  No vitals were obtained today due to virtual visit.  Physical Exam   GENERAL: Healthy, alert and no distress  EYES: Eyes grossly normal to inspection.  No discharge or erythema, or obvious scleral/conjunctival abnormalities.  RESP: No audible wheeze, cough, or visible cyanosis.  No visible retractions or increased work of breathing.    SKIN: Visible skin clear. No significant rash, abnormal pigmentation or lesions.  NEURO: Cranial nerves grossly intact.  Mentation and speech appropriate for age.  PSYCH: Mentation appears normal, affect normal/bright, judgement and insight intact, normal speech and appearance well-groomed.                Video-Visit Details    Type of service:  Video Visit     Originating Location (pt. Location): Home    Distant Location (provider location):  Off-site  Platform used for Video Visit: [employer]

## 2023-07-29 RX ORDER — LEVOTHYROXINE SODIUM 125 UG/1
TABLET ORAL
Qty: 90 TABLET | Refills: 0 | Status: SHIPPED | OUTPATIENT
Start: 2023-07-29 | End: 2023-09-25

## 2023-09-06 ENCOUNTER — NURSE TRIAGE (OUTPATIENT)
Dept: NURSING | Facility: CLINIC | Age: 43
End: 2023-09-06
Payer: COMMERCIAL

## 2023-09-06 NOTE — TELEPHONE ENCOUNTER
The patient is complaining of low to mid back pain  Symptoms started on 09/04/23  She reports she pushed a big dog off her bed a few days ago, so she is not sure if she strained or pulled a muscle causing the back pain.  103.7 fever via forehead scanner during the night of 09/06/23  Down to  with ibuprofen  Body aches, sweats and chills  Triage guidelines recommend to go to office now  Caller verbalized and understands directives    Reason for Disposition   Severe chills (i.e., feeling extremely cold WITH shaking chills)    Additional Information   Negative: Difficult to awaken or acting confused (e.g., disoriented, slurred speech)   Negative: Pale cold skin and very weak (can't stand)   Negative: Difficulty breathing and bluish (or gray) lips or face   Negative: New-onset rash with purple (or blood-colored) spots or dots   Negative: Sounds like a life-threatening emergency to the triager   Negative: Fever onset within 24 hours of receiving vaccine   Negative: Fever within 14 days of COVID-19 Exposure   Negative: Pregnant   Negative: Postpartum (from 0 to 6 weeks after delivery)   Negative: Headache and stiff neck (can't touch chin to chest)   Negative: Difficulty breathing   Negative: IV Drug Use (IVDU)   Negative: Fever > 100.0 F (37.8 C) and indwelling urinary catheter (e.g., Mota, coude)   Negative: Fever > 100.4 F (38.0 C) and has port (portacath), central line, or PICC line   Negative: Drinking very little and dehydration suspected (e.g., no urine > 12 hours, very dry mouth, very lightheaded)   Negative: Fever > 103 F (39.4 C)   Negative: Patient sounds very sick or weak to the triager   Negative: Widespread rash and cause unknown   Negative: Transplant patient (e.g., liver, heart, lung, kidney)   Negative: Fever > 100.4 F (38.0 C) and surgery in the past month   Negative: Fever > 100.0 F (37.8 C) and diabetes mellitus or a weak immune system (e.g., HIV positive, chemotherapy, splenectomy)   Negative:  Fever > 100.0 F (37.8 C) and bedridden (e.g., CVA, chronic illness, recovering from surgery)   Negative: Fever > 101 F (38.3 C) and over 60 years of age    Protocols used: Fever-A-OH

## 2023-09-20 ENCOUNTER — LAB (OUTPATIENT)
Dept: LAB | Facility: CLINIC | Age: 43
End: 2023-09-20
Payer: COMMERCIAL

## 2023-09-20 DIAGNOSIS — E83.51 HYPOCALCEMIA: ICD-10-CM

## 2023-09-20 DIAGNOSIS — E03.9 ACQUIRED HYPOTHYROIDISM: ICD-10-CM

## 2023-09-20 LAB
CALCIUM SERPL-MCNC: 8.3 MG/DL (ref 8.6–10)
CREAT SERPL-MCNC: 1.09 MG/DL (ref 0.51–0.95)
EGFRCR SERPLBLD CKD-EPI 2021: 65 ML/MIN/1.73M2
T4 FREE SERPL-MCNC: 1.98 NG/DL (ref 0.9–1.7)
TSH SERPL DL<=0.005 MIU/L-ACNC: 0.26 UIU/ML (ref 0.3–4.2)

## 2023-09-20 PROCEDURE — 82310 ASSAY OF CALCIUM: CPT

## 2023-09-20 PROCEDURE — 36415 COLL VENOUS BLD VENIPUNCTURE: CPT

## 2023-09-20 PROCEDURE — 82565 ASSAY OF CREATININE: CPT

## 2023-09-20 PROCEDURE — 84439 ASSAY OF FREE THYROXINE: CPT

## 2023-09-20 PROCEDURE — 84443 ASSAY THYROID STIM HORMONE: CPT

## 2023-09-23 ENCOUNTER — MYC MEDICAL ADVICE (OUTPATIENT)
Dept: FAMILY MEDICINE | Facility: CLINIC | Age: 43
End: 2023-09-23
Payer: COMMERCIAL

## 2023-09-23 DIAGNOSIS — E03.9 ACQUIRED HYPOTHYROIDISM: ICD-10-CM

## 2023-09-25 RX ORDER — LEVOTHYROXINE SODIUM 125 UG/1
125 TABLET ORAL DAILY
Qty: 90 TABLET | Refills: 3 | Status: SHIPPED | OUTPATIENT
Start: 2023-09-25

## 2023-11-10 ENCOUNTER — ANCILLARY PROCEDURE (OUTPATIENT)
Dept: GENERAL RADIOLOGY | Facility: CLINIC | Age: 43
End: 2023-11-10
Attending: PHYSICIAN ASSISTANT
Payer: COMMERCIAL

## 2023-11-10 ENCOUNTER — OFFICE VISIT (OUTPATIENT)
Dept: FAMILY MEDICINE | Facility: CLINIC | Age: 43
End: 2023-11-10
Payer: COMMERCIAL

## 2023-11-10 VITALS
DIASTOLIC BLOOD PRESSURE: 64 MMHG | WEIGHT: 136.6 LBS | RESPIRATION RATE: 20 BRPM | BODY MASS INDEX: 24.2 KG/M2 | SYSTOLIC BLOOD PRESSURE: 110 MMHG | HEIGHT: 63 IN | OXYGEN SATURATION: 97 % | HEART RATE: 70 BPM | TEMPERATURE: 97.4 F

## 2023-11-10 DIAGNOSIS — M25.571 PAIN IN JOINT, ANKLE AND FOOT, RIGHT: ICD-10-CM

## 2023-11-10 DIAGNOSIS — M79.671 RIGHT FOOT PAIN: ICD-10-CM

## 2023-11-10 DIAGNOSIS — M79.671 RIGHT FOOT PAIN: Primary | ICD-10-CM

## 2023-11-10 PROCEDURE — 73600 X-RAY EXAM OF ANKLE: CPT | Mod: TC | Performed by: RADIOLOGY

## 2023-11-10 PROCEDURE — 99213 OFFICE O/P EST LOW 20 MIN: CPT | Performed by: PHYSICIAN ASSISTANT

## 2023-11-10 PROCEDURE — 73630 X-RAY EXAM OF FOOT: CPT | Mod: TC | Performed by: RADIOLOGY

## 2023-11-10 ASSESSMENT — ENCOUNTER SYMPTOMS
NUMBNESS: 0
PARESTHESIAS: 0
ARTHRALGIAS: 1
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
## 2023-11-27 ENCOUNTER — TRANSFERRED RECORDS (OUTPATIENT)
Dept: HEALTH INFORMATION MANAGEMENT | Facility: CLINIC | Age: 43
End: 2023-11-27
Payer: COMMERCIAL

## 2024-02-01 ENCOUNTER — TRANSFERRED RECORDS (OUTPATIENT)
Dept: HEALTH INFORMATION MANAGEMENT | Facility: CLINIC | Age: 44
End: 2024-02-01
Payer: COMMERCIAL

## 2024-02-28 ENCOUNTER — OFFICE VISIT (OUTPATIENT)
Dept: FAMILY MEDICINE | Facility: CLINIC | Age: 44
End: 2024-02-28
Payer: COMMERCIAL

## 2024-02-28 VITALS
BODY MASS INDEX: 25.29 KG/M2 | WEIGHT: 142.7 LBS | OXYGEN SATURATION: 98 % | HEART RATE: 76 BPM | DIASTOLIC BLOOD PRESSURE: 60 MMHG | HEIGHT: 63 IN | RESPIRATION RATE: 16 BRPM | TEMPERATURE: 97.5 F | SYSTOLIC BLOOD PRESSURE: 104 MMHG

## 2024-02-28 DIAGNOSIS — L50.9 HIVES: ICD-10-CM

## 2024-02-28 DIAGNOSIS — N10 PYELONEPHRITIS, ACUTE: Primary | ICD-10-CM

## 2024-02-28 PROCEDURE — 99214 OFFICE O/P EST MOD 30 MIN: CPT | Performed by: FAMILY MEDICINE

## 2024-02-28 PROCEDURE — 87086 URINE CULTURE/COLONY COUNT: CPT | Performed by: FAMILY MEDICINE

## 2024-02-28 RX ORDER — PREDNISONE 20 MG/1
40 TABLET ORAL DAILY
Qty: 14 TABLET | Refills: 0 | Status: SHIPPED | OUTPATIENT
Start: 2024-02-28 | End: 2024-02-28

## 2024-02-28 RX ORDER — PREDNISONE 20 MG/1
40 TABLET ORAL DAILY
Qty: 14 TABLET | Refills: 0 | Status: SHIPPED | OUTPATIENT
Start: 2024-02-28

## 2024-02-28 RX ORDER — SULFAMETHOXAZOLE/TRIMETHOPRIM 800-160 MG
1 TABLET ORAL 2 TIMES DAILY
Qty: 14 TABLET | Refills: 0 | Status: SHIPPED | OUTPATIENT
Start: 2024-02-28 | End: 2024-02-28

## 2024-02-28 RX ORDER — CYCLOBENZAPRINE HCL 10 MG
10 TABLET ORAL
COMMUNITY
Start: 2024-02-25

## 2024-02-28 RX ORDER — SULFAMETHOXAZOLE/TRIMETHOPRIM 800-160 MG
1 TABLET ORAL 2 TIMES DAILY
Qty: 14 TABLET | Refills: 0 | Status: SHIPPED | OUTPATIENT
Start: 2024-02-28

## 2024-02-28 RX ORDER — NITROFURANTOIN 25; 75 MG/1; MG/1
100 CAPSULE ORAL 2 TIMES DAILY
COMMUNITY
End: 2024-02-28

## 2024-02-28 NOTE — PROGRESS NOTES
"  Assessment & Plan   Problem List Items Addressed This Visit    None  Visit Diagnoses       Pyelonephritis, acute    -  Primary    Relevant Medications    sulfamethoxazole-trimethoprim (BACTRIM DS) 800-160 MG tablet    Other Relevant Orders    Urine Culture    Hives        Relevant Medications    predniSONE (DELTASONE) 20 MG tablet           Patient recently seen at the [facility] emergency room.  She reports that she has had pyelonephritis before and felt similar symptoms so went to the emergency room right away.  Emergency room notes reviewed in care everywhere.  Patient's urine culture shows a contaminated specimen.  Patient tells me that she was told to collect her urine in a potty hat which would explain the contaminated specimen.  When she was at the emergency room she had not yet developed a fever however subsequently has developed a fever.  She was started on Macrobid and has developed hives.  See picture taken today under the media tab.  She continues to have flank pain.    On exam  Skin diffuse hives  Chest no increased work of breathing  Back has CVA tenderness on the right none on the left    Assessment and plan  Acute pyelonephritis will add Macrobid to patient's allergy list and get her started on some prednisone have her start Bactrim.  Will also get a clean-catch urine culture today.  Return to clinic if symptoms worsen or do not improve.       BMI  Estimated body mass index is 25.68 kg/m  as calculated from the following:    Height as of this encounter: 1.588 m (5' 2.5\").    Weight as of this encounter: 64.7 kg (142 lb 11.2 oz).             Deyvi Muhammad is a 44 year old, presenting for the following health issues:  Hospital F/U (Pt c/o rash on chest, going up neck and onto face since yesterday. She believes she is reacting to the abx. She had a fever yesterday of 103.1. She took Tylenol at 3am and ibuprofen at 6am. )        2/28/2024     1:58 PM   Additional Questions   Roomed " "by Thao     HPI                   Objective    /60 (BP Location: Right arm, Patient Position: Sitting)   Pulse 76   Temp 97.5  F (36.4  C) (Tympanic)   Resp 16   Ht 1.588 m (5' 2.5\")   Wt 64.7 kg (142 lb 11.2 oz)   LMP  (LMP Unknown)   SpO2 98%   BMI 25.68 kg/m    Body mass index is 25.68 kg/m .  Physical Exam               Signed Electronically by: Nelida Duron MD    "

## 2024-02-29 LAB — BACTERIA UR CULT: NORMAL

## 2024-03-10 ENCOUNTER — HEALTH MAINTENANCE LETTER (OUTPATIENT)
Age: 44
End: 2024-03-10

## 2024-07-09 DIAGNOSIS — G43.009 MIGRAINE WITHOUT AURA AND WITHOUT STATUS MIGRAINOSUS, NOT INTRACTABLE: ICD-10-CM

## 2024-07-09 RX ORDER — SUMATRIPTAN 100 MG/1
TABLET, FILM COATED ORAL
Qty: 15 TABLET | Refills: 0 | Status: SHIPPED | OUTPATIENT
Start: 2024-07-09 | End: 2024-09-09

## 2024-07-27 ENCOUNTER — HEALTH MAINTENANCE LETTER (OUTPATIENT)
Age: 44
End: 2024-07-27

## 2024-09-07 DIAGNOSIS — G43.009 MIGRAINE WITHOUT AURA AND WITHOUT STATUS MIGRAINOSUS, NOT INTRACTABLE: ICD-10-CM

## 2024-09-09 RX ORDER — SUMATRIPTAN 100 MG/1
TABLET, FILM COATED ORAL
Qty: 15 TABLET | Refills: 0 | Status: SHIPPED | OUTPATIENT
Start: 2024-09-09

## 2024-10-07 ENCOUNTER — DOCUMENTATION ONLY (OUTPATIENT)
Dept: FAMILY MEDICINE | Facility: CLINIC | Age: 44
End: 2024-10-07
Payer: COMMERCIAL

## 2024-10-07 DIAGNOSIS — E89.2 SURGICAL HYPOPARATHYROIDISM (H): ICD-10-CM

## 2024-10-07 DIAGNOSIS — E03.9 ACQUIRED HYPOTHYROIDISM: ICD-10-CM

## 2024-10-07 DIAGNOSIS — E83.51 HYPOCALCEMIA: Primary | ICD-10-CM

## 2024-10-07 NOTE — PROGRESS NOTES
Patient has upcoming lab appointment 10-17-24. Requesting TSH and calcium. Please place orders.    Thank You,  Bev

## 2024-10-17 ENCOUNTER — OFFICE VISIT (OUTPATIENT)
Dept: FAMILY MEDICINE | Facility: CLINIC | Age: 44
End: 2024-10-17
Payer: COMMERCIAL

## 2024-10-17 VITALS
SYSTOLIC BLOOD PRESSURE: 110 MMHG | BODY MASS INDEX: 23.92 KG/M2 | TEMPERATURE: 97.7 F | WEIGHT: 135 LBS | RESPIRATION RATE: 20 BRPM | HEART RATE: 64 BPM | DIASTOLIC BLOOD PRESSURE: 70 MMHG | OXYGEN SATURATION: 100 % | HEIGHT: 63 IN

## 2024-10-17 DIAGNOSIS — E83.51 HYPOCALCEMIA: ICD-10-CM

## 2024-10-17 DIAGNOSIS — Z12.31 VISIT FOR SCREENING MAMMOGRAM: Primary | ICD-10-CM

## 2024-10-17 DIAGNOSIS — E89.2 SURGICAL HYPOPARATHYROIDISM (H): ICD-10-CM

## 2024-10-17 DIAGNOSIS — Z71.6 ENCOUNTER FOR TOBACCO USE CESSATION COUNSELING: ICD-10-CM

## 2024-10-17 DIAGNOSIS — E03.9 ACQUIRED HYPOTHYROIDISM: ICD-10-CM

## 2024-10-17 PROBLEM — F17.200 TOBACCO DEPENDENCE SYNDROME: Status: ACTIVE | Noted: 2023-04-24

## 2024-10-17 PROBLEM — N94.6 DYSMENORRHEA: Status: ACTIVE | Noted: 2023-04-24

## 2024-10-17 PROCEDURE — 84443 ASSAY THYROID STIM HORMONE: CPT | Performed by: FAMILY MEDICINE

## 2024-10-17 PROCEDURE — 80048 BASIC METABOLIC PNL TOTAL CA: CPT | Performed by: FAMILY MEDICINE

## 2024-10-17 PROCEDURE — 99214 OFFICE O/P EST MOD 30 MIN: CPT | Mod: 25 | Performed by: FAMILY MEDICINE

## 2024-10-17 PROCEDURE — 36415 COLL VENOUS BLD VENIPUNCTURE: CPT | Performed by: FAMILY MEDICINE

## 2024-10-17 PROCEDURE — 99396 PREV VISIT EST AGE 40-64: CPT | Performed by: FAMILY MEDICINE

## 2024-10-17 RX ORDER — LEVOTHYROXINE SODIUM 125 UG/1
125 TABLET ORAL DAILY
Qty: 90 TABLET | Refills: 3 | Status: CANCELLED | OUTPATIENT
Start: 2024-10-17

## 2024-10-17 RX ORDER — LEVOTHYROXINE SODIUM 150 UG/1
150 TABLET ORAL DAILY
Qty: 90 TABLET | Refills: 3 | Status: SHIPPED | OUTPATIENT
Start: 2024-10-17

## 2024-10-17 RX ORDER — BUPROPION HYDROCHLORIDE 150 MG/1
150 TABLET ORAL EVERY MORNING
Qty: 90 TABLET | Refills: 3 | Status: SHIPPED | OUTPATIENT
Start: 2024-10-17

## 2024-10-17 NOTE — PROGRESS NOTES
"  Assessment & Plan     Acquired hypothyroidism  Stable, Controlled by prescription medication prescribed by me and up to date.  - levothyroxine (SYNTHROID/LEVOTHROID) 150 MCG tablet; Take 1 tablet (150 mcg) by mouth daily.    Visit for screening mammogram  - MA Screening Bilateral w/ Ulices; Future    Surgical hypoparathyroidism (H)  Stable, Controlled and monitored by me and up to date.      Encounter for tobacco use cessation counseling  Restarted smoking  - buPROPion (WELLBUTRIN XL) 150 MG 24 hr tablet; Take 1 tablet (150 mg) by mouth every morning.              Deyvi Muhammad is a 44 year old, presenting for the following health issues:  Thyroid Problem (Pt here for a thyroid medcheck)        10/17/2024     4:42 PM   Additional Questions   Roomed by Rodney ALICEA     History of Present Illness       Hypothyroidism:     Since last visit, patient describes the following symptoms::  Constipation, Depression, Fatigue and Weight gain    Weight gain::  Less than 5 lbs.    She eats 0-1 servings of fruits and vegetables daily.She consumes 0 sweetened beverage(s) daily.She exercises with enough effort to increase her heart rate 30 to 60 minutes per day.  She exercises with enough effort to increase her heart rate 5 days per week.   She is taking medications regularly.         Hypothyroidism Follow-up    Since last visit, patient describes the following symptoms: weight gain of 15 lbs, constipation, and depression              Objective    /70   Pulse 64   Temp 97.7  F (36.5  C) (Tympanic)   Resp 20   Ht 1.588 m (5' 2.5\")   Wt 61.2 kg (135 lb)   LMP  (LMP Unknown)   SpO2 100%   BMI 24.30 kg/m    Body mass index is 24.3 kg/m .  Physical Exam   GENERAL: alert and no distress  NECK: no adenopathy, no asymmetry, masses, or scars  RESP: lungs clear to auscultation - no rales, rhonchi or wheezes  CV: regular rate and rhythm, normal S1 S2, no S3 or S4, no murmur, click or rub, no peripheral edema  ABDOMEN: soft, " nontender, no hepatosplenomegaly, no masses and bowel sounds normal  MS: no gross musculoskeletal defects noted, no edema            Signed Electronically by: Vladimir Mahajan MD

## 2024-10-18 ENCOUNTER — PATIENT OUTREACH (OUTPATIENT)
Dept: CARE COORDINATION | Facility: CLINIC | Age: 44
End: 2024-10-18
Payer: COMMERCIAL

## 2024-10-18 LAB
ANION GAP SERPL CALCULATED.3IONS-SCNC: 15 MMOL/L (ref 7–15)
BUN SERPL-MCNC: 11.2 MG/DL (ref 6–20)
CALCIUM SERPL-MCNC: 7.2 MG/DL (ref 8.8–10.4)
CHLORIDE SERPL-SCNC: 101 MMOL/L (ref 98–107)
CREAT SERPL-MCNC: 1.14 MG/DL (ref 0.51–0.95)
EGFRCR SERPLBLD CKD-EPI 2021: 61 ML/MIN/1.73M2
GLUCOSE SERPL-MCNC: 94 MG/DL (ref 70–99)
HCO3 SERPL-SCNC: 27 MMOL/L (ref 22–29)
POTASSIUM SERPL-SCNC: 4.1 MMOL/L (ref 3.4–5.3)
SODIUM SERPL-SCNC: 143 MMOL/L (ref 135–145)
TSH SERPL DL<=0.005 MIU/L-ACNC: 0.41 UIU/ML (ref 0.3–4.2)

## 2025-03-18 ENCOUNTER — PATIENT OUTREACH (OUTPATIENT)
Dept: CARE COORDINATION | Facility: CLINIC | Age: 45
End: 2025-03-18
Payer: COMMERCIAL

## 2025-04-15 ENCOUNTER — MYC REFILL (OUTPATIENT)
Dept: FAMILY MEDICINE | Facility: CLINIC | Age: 45
End: 2025-04-15
Payer: COMMERCIAL

## 2025-04-15 DIAGNOSIS — E83.51 HYPOCALCEMIA: ICD-10-CM

## 2025-04-15 DIAGNOSIS — G43.009 MIGRAINE WITHOUT AURA AND WITHOUT STATUS MIGRAINOSUS, NOT INTRACTABLE: ICD-10-CM

## 2025-04-15 RX ORDER — CALCITRIOL 0.5 UG/1
CAPSULE, LIQUID FILLED ORAL
Qty: 270 CAPSULE | Refills: 3 | Status: SHIPPED | OUTPATIENT
Start: 2025-04-15

## 2025-04-15 RX ORDER — SUMATRIPTAN SUCCINATE 100 MG/1
TABLET ORAL
Qty: 15 TABLET | Refills: 0 | Status: SHIPPED | OUTPATIENT
Start: 2025-04-15

## 2025-04-15 NOTE — TELEPHONE ENCOUNTER
Prescription approved per [facility] refill protocol.  Rocío VALDEZ RN

## 2025-04-15 NOTE — TELEPHONE ENCOUNTER
Routing refill request to provider for review/approval because:  Drug not on the [facility] refill protocol   A break in medication

## 2025-06-30 ENCOUNTER — MYC REFILL (OUTPATIENT)
Dept: FAMILY MEDICINE | Facility: CLINIC | Age: 45
End: 2025-06-30
Payer: COMMERCIAL

## 2025-06-30 DIAGNOSIS — G43.009 MIGRAINE WITHOUT AURA AND WITHOUT STATUS MIGRAINOSUS, NOT INTRACTABLE: ICD-10-CM

## 2025-06-30 RX ORDER — SUMATRIPTAN SUCCINATE 100 MG/1
TABLET ORAL
Qty: 15 TABLET | Refills: 0 | Status: SHIPPED | OUTPATIENT
Start: 2025-06-30

## 2025-06-30 NOTE — TELEPHONE ENCOUNTER
Prescription approved per [facility] refill protocol.  Rocío VALDEZ RN